# Patient Record
Sex: FEMALE | Race: WHITE | NOT HISPANIC OR LATINO | Employment: OTHER | URBAN - METROPOLITAN AREA
[De-identification: names, ages, dates, MRNs, and addresses within clinical notes are randomized per-mention and may not be internally consistent; named-entity substitution may affect disease eponyms.]

---

## 2021-05-25 ENCOUNTER — RECORDS - HEALTHEAST (OUTPATIENT)
Dept: ADMINISTRATIVE | Facility: CLINIC | Age: 76
End: 2021-05-25

## 2022-02-04 RX ORDER — FUROSEMIDE 40 MG
TABLET ORAL
Qty: 90 TABLET | OUTPATIENT
Start: 2022-02-04

## 2022-02-05 NOTE — TELEPHONE ENCOUNTER
No primary care provider listed.  Refill request denied.    Hortencia Carrizales, MAJO  Triage Nurse Advisor

## 2022-09-16 RX ORDER — ALENDRONATE SODIUM 70 MG/1
TABLET ORAL
Qty: 12 TABLET | OUTPATIENT
Start: 2022-09-16

## 2022-10-20 RX ORDER — ATORVASTATIN CALCIUM 40 MG/1
TABLET, FILM COATED ORAL
Qty: 90 TABLET | Refills: 0 | OUTPATIENT
Start: 2022-10-20

## 2022-10-20 NOTE — TELEPHONE ENCOUNTER
"Refill denied.  She has never been seen in our system.    Last Written Prescription Date:  ???  Last Fill Quantity: ???,  # refills: ???   Last office visit provider:  NEVER    Requested Prescriptions   Pending Prescriptions Disp Refills     atorvastatin (LIPITOR) 40 MG tablet [Pharmacy Med Name: ATORVASTATIN CALCIUM 40 MG Tablet] 90 tablet      Sig: TAKE 1 TABLET EVERY DAY       Statins Protocol Failed - 10/19/2022  4:30 PM        Failed - LDL on file in past 12 months     No lab results found.          Failed - Recent (12 mo) or future (30 days) visit within the authorizing provider's specialty     Patient has had an office visit with the authorizing provider or a provider within the authorizing providers department within the previous 12 mos or has a future within next 30 days. See \"Patient Info\" tab in inbasket, or \"Choose Columns\" in Meds & Orders section of the refill encounter.              Failed - Medication is active on med list        Passed - No abnormal creatine kinase in past 12 months     No lab results found.             Passed - Patient is age 18 or older        Passed - No active pregnancy on record        Passed - No positive pregnancy test in past 12 months             Aleshia Johnson 10/20/22 2:11 PM  "

## 2023-07-22 ENCOUNTER — HOSPITAL ENCOUNTER (OUTPATIENT)
Facility: HOSPITAL | Age: 78
Setting detail: OBSERVATION
Discharge: NURSING, REHAB OR LONG-TERM CARE | End: 2023-07-25
Attending: STUDENT IN AN ORGANIZED HEALTH CARE EDUCATION/TRAINING PROGRAM | Admitting: INTERNAL MEDICINE
Payer: COMMERCIAL

## 2023-07-22 DIAGNOSIS — R53.1 WEAKNESS: ICD-10-CM

## 2023-07-22 DIAGNOSIS — Z96.652 STATUS POST TOTAL LEFT KNEE REPLACEMENT: Primary | ICD-10-CM

## 2023-07-22 DIAGNOSIS — E83.42 HYPOMAGNESEMIA: ICD-10-CM

## 2023-07-22 LAB
ALBUMIN SERPL BCG-MCNC: 3.7 G/DL (ref 3.5–5.2)
ALBUMIN UR-MCNC: NEGATIVE MG/DL
ALP SERPL-CCNC: 59 U/L (ref 35–104)
ALT SERPL W P-5'-P-CCNC: 14 U/L (ref 0–50)
ANION GAP SERPL CALCULATED.3IONS-SCNC: 10 MMOL/L (ref 7–15)
APPEARANCE UR: CLEAR
AST SERPL W P-5'-P-CCNC: 25 U/L (ref 0–45)
BASOPHILS # BLD AUTO: 0 10E3/UL (ref 0–0.2)
BASOPHILS NFR BLD AUTO: 1 %
BILIRUB SERPL-MCNC: 0.2 MG/DL
BILIRUB UR QL STRIP: NEGATIVE
BUN SERPL-MCNC: 11.5 MG/DL (ref 8–23)
CALCIUM SERPL-MCNC: 10.8 MG/DL (ref 8.8–10.2)
CHLORIDE SERPL-SCNC: 108 MMOL/L (ref 98–107)
COLOR UR AUTO: ABNORMAL
CREAT SERPL-MCNC: 0.62 MG/DL (ref 0.51–0.95)
DEPRECATED HCO3 PLAS-SCNC: 23 MMOL/L (ref 22–29)
EOSINOPHIL # BLD AUTO: 0 10E3/UL (ref 0–0.7)
EOSINOPHIL NFR BLD AUTO: 0 %
ERYTHROCYTE [DISTWIDTH] IN BLOOD BY AUTOMATED COUNT: 17.5 % (ref 10–15)
GFR SERPL CREATININE-BSD FRML MDRD: >90 ML/MIN/1.73M2
GLUCOSE BLDC GLUCOMTR-MCNC: 127 MG/DL (ref 70–99)
GLUCOSE BLDC GLUCOMTR-MCNC: 147 MG/DL (ref 70–99)
GLUCOSE SERPL-MCNC: 122 MG/DL (ref 70–99)
GLUCOSE UR STRIP-MCNC: 30 MG/DL
HCT VFR BLD AUTO: 31.7 % (ref 35–47)
HGB BLD-MCNC: 9.6 G/DL (ref 11.7–15.7)
HGB UR QL STRIP: NEGATIVE
IMM GRANULOCYTES # BLD: 0 10E3/UL
IMM GRANULOCYTES NFR BLD: 0 %
KETONES UR STRIP-MCNC: NEGATIVE MG/DL
LACTATE SERPL-SCNC: 1.1 MMOL/L (ref 0.7–2)
LEUKOCYTE ESTERASE UR QL STRIP: NEGATIVE
LYMPHOCYTES # BLD AUTO: 1.1 10E3/UL (ref 0.8–5.3)
LYMPHOCYTES NFR BLD AUTO: 13 %
MAGNESIUM SERPL-MCNC: 1.6 MG/DL (ref 1.7–2.3)
MCH RBC QN AUTO: 24.7 PG (ref 26.5–33)
MCHC RBC AUTO-ENTMCNC: 30.3 G/DL (ref 31.5–36.5)
MCV RBC AUTO: 82 FL (ref 78–100)
MONOCYTES # BLD AUTO: 1.1 10E3/UL (ref 0–1.3)
MONOCYTES NFR BLD AUTO: 13 %
MUCOUS THREADS #/AREA URNS LPF: PRESENT /LPF
NEUTROPHILS # BLD AUTO: 6.2 10E3/UL (ref 1.6–8.3)
NEUTROPHILS NFR BLD AUTO: 73 %
NITRATE UR QL: NEGATIVE
NRBC # BLD AUTO: 0 10E3/UL
NRBC BLD AUTO-RTO: 0 /100
PH UR STRIP: 5.5 [PH] (ref 5–7)
PLATELET # BLD AUTO: 325 10E3/UL (ref 150–450)
POTASSIUM SERPL-SCNC: 3.8 MMOL/L (ref 3.4–5.3)
PROT SERPL-MCNC: 6.2 G/DL (ref 6.4–8.3)
RBC # BLD AUTO: 3.88 10E6/UL (ref 3.8–5.2)
RBC URINE: <1 /HPF
SODIUM SERPL-SCNC: 141 MMOL/L (ref 136–145)
SP GR UR STRIP: 1.01 (ref 1–1.03)
SQUAMOUS EPITHELIAL: 1 /HPF
TRANSITIONAL EPI: <1 /HPF
TROPONIN T SERPL HS-MCNC: 14 NG/L
UROBILINOGEN UR STRIP-MCNC: <2 MG/DL
WBC # BLD AUTO: 8.5 10E3/UL (ref 4–11)
WBC URINE: 0 /HPF

## 2023-07-22 PROCEDURE — 250N000013 HC RX MED GY IP 250 OP 250 PS 637: Performed by: INTERNAL MEDICINE

## 2023-07-22 PROCEDURE — 36415 COLL VENOUS BLD VENIPUNCTURE: CPT | Performed by: STUDENT IN AN ORGANIZED HEALTH CARE EDUCATION/TRAINING PROGRAM

## 2023-07-22 PROCEDURE — 258N000003 HC RX IP 258 OP 636: Performed by: STUDENT IN AN ORGANIZED HEALTH CARE EDUCATION/TRAINING PROGRAM

## 2023-07-22 PROCEDURE — 250N000012 HC RX MED GY IP 250 OP 636 PS 637: Performed by: INTERNAL MEDICINE

## 2023-07-22 PROCEDURE — 250N000011 HC RX IP 250 OP 636: Mod: JZ | Performed by: INTERNAL MEDICINE

## 2023-07-22 PROCEDURE — 96372 THER/PROPH/DIAG INJ SC/IM: CPT | Mod: XS | Performed by: INTERNAL MEDICINE

## 2023-07-22 PROCEDURE — 81001 URINALYSIS AUTO W/SCOPE: CPT | Performed by: STUDENT IN AN ORGANIZED HEALTH CARE EDUCATION/TRAINING PROGRAM

## 2023-07-22 PROCEDURE — 96360 HYDRATION IV INFUSION INIT: CPT

## 2023-07-22 PROCEDURE — G0378 HOSPITAL OBSERVATION PER HR: HCPCS

## 2023-07-22 PROCEDURE — 84484 ASSAY OF TROPONIN QUANT: CPT | Performed by: STUDENT IN AN ORGANIZED HEALTH CARE EDUCATION/TRAINING PROGRAM

## 2023-07-22 PROCEDURE — 93005 ELECTROCARDIOGRAM TRACING: CPT | Performed by: STUDENT IN AN ORGANIZED HEALTH CARE EDUCATION/TRAINING PROGRAM

## 2023-07-22 PROCEDURE — 80053 COMPREHEN METABOLIC PANEL: CPT | Performed by: STUDENT IN AN ORGANIZED HEALTH CARE EDUCATION/TRAINING PROGRAM

## 2023-07-22 PROCEDURE — 96361 HYDRATE IV INFUSION ADD-ON: CPT

## 2023-07-22 PROCEDURE — 250N000011 HC RX IP 250 OP 636: Mod: JZ | Performed by: STUDENT IN AN ORGANIZED HEALTH CARE EDUCATION/TRAINING PROGRAM

## 2023-07-22 PROCEDURE — 82962 GLUCOSE BLOOD TEST: CPT

## 2023-07-22 PROCEDURE — 99222 1ST HOSP IP/OBS MODERATE 55: CPT | Performed by: INTERNAL MEDICINE

## 2023-07-22 PROCEDURE — 83735 ASSAY OF MAGNESIUM: CPT | Performed by: STUDENT IN AN ORGANIZED HEALTH CARE EDUCATION/TRAINING PROGRAM

## 2023-07-22 PROCEDURE — 99285 EMERGENCY DEPT VISIT HI MDM: CPT | Mod: 25

## 2023-07-22 PROCEDURE — 250N000013 HC RX MED GY IP 250 OP 250 PS 637: Performed by: STUDENT IN AN ORGANIZED HEALTH CARE EDUCATION/TRAINING PROGRAM

## 2023-07-22 PROCEDURE — 85025 COMPLETE CBC W/AUTO DIFF WBC: CPT | Performed by: STUDENT IN AN ORGANIZED HEALTH CARE EDUCATION/TRAINING PROGRAM

## 2023-07-22 PROCEDURE — 83605 ASSAY OF LACTIC ACID: CPT | Performed by: STUDENT IN AN ORGANIZED HEALTH CARE EDUCATION/TRAINING PROGRAM

## 2023-07-22 PROCEDURE — 96365 THER/PROPH/DIAG IV INF INIT: CPT

## 2023-07-22 RX ORDER — ACETAMINOPHEN 650 MG/1
650 SUPPOSITORY RECTAL EVERY 6 HOURS PRN
Status: DISCONTINUED | OUTPATIENT
Start: 2023-07-22 | End: 2023-07-25 | Stop reason: HOSPADM

## 2023-07-22 RX ORDER — ONDANSETRON 4 MG/1
4 TABLET, ORALLY DISINTEGRATING ORAL EVERY 8 HOURS PRN
COMMUNITY
End: 2023-07-22

## 2023-07-22 RX ORDER — NICOTINE POLACRILEX 4 MG
15-30 LOZENGE BUCCAL
Status: DISCONTINUED | OUTPATIENT
Start: 2023-07-22 | End: 2023-07-25 | Stop reason: HOSPADM

## 2023-07-22 RX ORDER — LISINOPRIL 10 MG/1
10 TABLET ORAL DAILY
COMMUNITY

## 2023-07-22 RX ORDER — ALLOPURINOL 300 MG/1
300 TABLET ORAL DAILY
COMMUNITY

## 2023-07-22 RX ORDER — ACETAMINOPHEN 325 MG/1
325 TABLET ORAL ONCE
Status: COMPLETED | OUTPATIENT
Start: 2023-07-22 | End: 2023-07-22

## 2023-07-22 RX ORDER — AMOXICILLIN 250 MG
1 CAPSULE ORAL 2 TIMES DAILY
Status: DISCONTINUED | OUTPATIENT
Start: 2023-07-22 | End: 2023-07-24

## 2023-07-22 RX ORDER — FUROSEMIDE 20 MG
40 TABLET ORAL DAILY
Status: DISCONTINUED | OUTPATIENT
Start: 2023-07-22 | End: 2023-07-25 | Stop reason: HOSPADM

## 2023-07-22 RX ORDER — ONDANSETRON 4 MG/1
4 TABLET, ORALLY DISINTEGRATING ORAL EVERY 6 HOURS PRN
Status: DISCONTINUED | OUTPATIENT
Start: 2023-07-22 | End: 2023-07-25 | Stop reason: HOSPADM

## 2023-07-22 RX ORDER — NALOXONE HYDROCHLORIDE 0.4 MG/ML
0.2 INJECTION, SOLUTION INTRAMUSCULAR; INTRAVENOUS; SUBCUTANEOUS
Status: DISCONTINUED | OUTPATIENT
Start: 2023-07-22 | End: 2023-07-25 | Stop reason: HOSPADM

## 2023-07-22 RX ORDER — PRIMIDONE 50 MG/1
100 TABLET ORAL 3 TIMES DAILY
Status: DISCONTINUED | OUTPATIENT
Start: 2023-07-22 | End: 2023-07-25 | Stop reason: HOSPADM

## 2023-07-22 RX ORDER — ATORVASTATIN CALCIUM 40 MG/1
40 TABLET, FILM COATED ORAL DAILY
Status: DISCONTINUED | OUTPATIENT
Start: 2023-07-22 | End: 2023-07-25 | Stop reason: HOSPADM

## 2023-07-22 RX ORDER — POLYETHYLENE GLYCOL 3350 17 G/17G
17 POWDER, FOR SOLUTION ORAL DAILY PRN
Status: DISCONTINUED | OUTPATIENT
Start: 2023-07-22 | End: 2023-07-25 | Stop reason: HOSPADM

## 2023-07-22 RX ORDER — AMOXICILLIN 250 MG
2 CAPSULE ORAL 2 TIMES DAILY
Status: DISCONTINUED | OUTPATIENT
Start: 2023-07-22 | End: 2023-07-24

## 2023-07-22 RX ORDER — FUROSEMIDE 40 MG
40 TABLET ORAL DAILY
COMMUNITY

## 2023-07-22 RX ORDER — PRIMIDONE 50 MG/1
100 TABLET ORAL 3 TIMES DAILY
COMMUNITY

## 2023-07-22 RX ORDER — ONDANSETRON 2 MG/ML
4 INJECTION INTRAMUSCULAR; INTRAVENOUS EVERY 6 HOURS PRN
Status: DISCONTINUED | OUTPATIENT
Start: 2023-07-22 | End: 2023-07-25 | Stop reason: HOSPADM

## 2023-07-22 RX ORDER — POTASSIUM CHLORIDE 750 MG/1
10 TABLET, EXTENDED RELEASE ORAL 2 TIMES DAILY
COMMUNITY

## 2023-07-22 RX ORDER — MAGNESIUM SULFATE HEPTAHYDRATE 40 MG/ML
2 INJECTION, SOLUTION INTRAVENOUS ONCE
Status: COMPLETED | OUTPATIENT
Start: 2023-07-22 | End: 2023-07-22

## 2023-07-22 RX ORDER — ATORVASTATIN CALCIUM 40 MG/1
40 TABLET, FILM COATED ORAL DAILY
COMMUNITY

## 2023-07-22 RX ORDER — ENOXAPARIN SODIUM 100 MG/ML
40 INJECTION SUBCUTANEOUS EVERY 24 HOURS
Status: DISCONTINUED | OUTPATIENT
Start: 2023-07-22 | End: 2023-07-25 | Stop reason: HOSPADM

## 2023-07-22 RX ORDER — DICYCLOMINE HCL 20 MG
20 TABLET ORAL 3 TIMES DAILY PRN
Status: DISCONTINUED | OUTPATIENT
Start: 2023-07-22 | End: 2023-07-25 | Stop reason: HOSPADM

## 2023-07-22 RX ORDER — OXYCODONE HYDROCHLORIDE 5 MG/1
5 TABLET ORAL EVERY 4 HOURS PRN
Status: DISCONTINUED | OUTPATIENT
Start: 2023-07-22 | End: 2023-07-22

## 2023-07-22 RX ORDER — NALOXONE HYDROCHLORIDE 0.4 MG/ML
0.4 INJECTION, SOLUTION INTRAMUSCULAR; INTRAVENOUS; SUBCUTANEOUS
Status: DISCONTINUED | OUTPATIENT
Start: 2023-07-22 | End: 2023-07-25 | Stop reason: HOSPADM

## 2023-07-22 RX ORDER — ALLOPURINOL 300 MG/1
300 TABLET ORAL DAILY
Status: DISCONTINUED | OUTPATIENT
Start: 2023-07-22 | End: 2023-07-25 | Stop reason: HOSPADM

## 2023-07-22 RX ORDER — ALENDRONATE SODIUM 70 MG/1
70 TABLET ORAL
Status: DISCONTINUED | OUTPATIENT
Start: 2023-07-22 | End: 2023-07-22

## 2023-07-22 RX ORDER — PROCHLORPERAZINE MALEATE 5 MG
5 TABLET ORAL EVERY 6 HOURS PRN
Status: DISCONTINUED | OUTPATIENT
Start: 2023-07-22 | End: 2023-07-25 | Stop reason: HOSPADM

## 2023-07-22 RX ORDER — GABAPENTIN 300 MG/1
600 CAPSULE ORAL 2 TIMES DAILY
COMMUNITY

## 2023-07-22 RX ORDER — OXYCODONE HYDROCHLORIDE 5 MG/1
5 TABLET ORAL EVERY 4 HOURS PRN
Status: ON HOLD | COMMUNITY
End: 2023-07-25

## 2023-07-22 RX ORDER — OXYCODONE HYDROCHLORIDE 5 MG/1
5-10 TABLET ORAL EVERY 4 HOURS PRN
Status: DISCONTINUED | OUTPATIENT
Start: 2023-07-22 | End: 2023-07-25 | Stop reason: HOSPADM

## 2023-07-22 RX ORDER — ALENDRONATE SODIUM 70 MG/1
70 TABLET ORAL
COMMUNITY

## 2023-07-22 RX ORDER — DICYCLOMINE HCL 20 MG
20 TABLET ORAL 3 TIMES DAILY PRN
COMMUNITY

## 2023-07-22 RX ORDER — DEXTROSE MONOHYDRATE 25 G/50ML
25-50 INJECTION, SOLUTION INTRAVENOUS
Status: DISCONTINUED | OUTPATIENT
Start: 2023-07-22 | End: 2023-07-25 | Stop reason: HOSPADM

## 2023-07-22 RX ORDER — ACETAMINOPHEN 325 MG/1
650 TABLET ORAL EVERY 6 HOURS PRN
Status: DISCONTINUED | OUTPATIENT
Start: 2023-07-22 | End: 2023-07-25 | Stop reason: HOSPADM

## 2023-07-22 RX ORDER — OMEGA-3-ACID ETHYL ESTERS 1 G/1
1 CAPSULE, LIQUID FILLED ORAL DAILY
COMMUNITY

## 2023-07-22 RX ORDER — PROCHLORPERAZINE 25 MG
12.5 SUPPOSITORY, RECTAL RECTAL EVERY 12 HOURS PRN
Status: DISCONTINUED | OUTPATIENT
Start: 2023-07-22 | End: 2023-07-25 | Stop reason: HOSPADM

## 2023-07-22 RX ORDER — GABAPENTIN 300 MG/1
600 CAPSULE ORAL 2 TIMES DAILY
Status: DISCONTINUED | OUTPATIENT
Start: 2023-07-22 | End: 2023-07-25 | Stop reason: HOSPADM

## 2023-07-22 RX ORDER — HYDROXYZINE HYDROCHLORIDE 10 MG/1
10 TABLET, FILM COATED ORAL EVERY 6 HOURS PRN
COMMUNITY
End: 2023-07-22

## 2023-07-22 RX ORDER — LISINOPRIL 5 MG/1
10 TABLET ORAL DAILY
Status: DISCONTINUED | OUTPATIENT
Start: 2023-07-22 | End: 2023-07-25 | Stop reason: HOSPADM

## 2023-07-22 RX ORDER — POTASSIUM CHLORIDE 750 MG/1
10 TABLET, EXTENDED RELEASE ORAL 2 TIMES DAILY WITH MEALS
Status: DISCONTINUED | OUTPATIENT
Start: 2023-07-22 | End: 2023-07-25 | Stop reason: HOSPADM

## 2023-07-22 RX ADMIN — POTASSIUM CHLORIDE 10 MEQ: 750 TABLET, EXTENDED RELEASE ORAL at 18:08

## 2023-07-22 RX ADMIN — OXYCODONE HYDROCHLORIDE 5 MG: 5 TABLET ORAL at 23:35

## 2023-07-22 RX ADMIN — ACETAMINOPHEN 325 MG: 325 TABLET ORAL at 14:35

## 2023-07-22 RX ADMIN — OXYCODONE HYDROCHLORIDE 5 MG: 5 TABLET ORAL at 18:07

## 2023-07-22 RX ADMIN — MAGNESIUM SULFATE HEPTAHYDRATE 2 G: 40 INJECTION, SOLUTION INTRAVENOUS at 14:15

## 2023-07-22 RX ADMIN — GABAPENTIN 600 MG: 300 CAPSULE ORAL at 19:55

## 2023-07-22 RX ADMIN — INSULIN ASPART 1 UNITS: 100 INJECTION, SOLUTION INTRAVENOUS; SUBCUTANEOUS at 16:53

## 2023-07-22 RX ADMIN — SENNOSIDES AND DOCUSATE SODIUM 2 TABLET: 50; 8.6 TABLET ORAL at 19:55

## 2023-07-22 RX ADMIN — METFORMIN HYDROCHLORIDE 500 MG: 500 TABLET, FILM COATED ORAL at 19:55

## 2023-07-22 RX ADMIN — LISINOPRIL 10 MG: 5 TABLET ORAL at 19:55

## 2023-07-22 RX ADMIN — ENOXAPARIN SODIUM 40 MG: 40 INJECTION SUBCUTANEOUS at 17:04

## 2023-07-22 RX ADMIN — SODIUM CHLORIDE 1000 ML: 9 INJECTION, SOLUTION INTRAVENOUS at 12:17

## 2023-07-22 RX ADMIN — ATORVASTATIN CALCIUM 40 MG: 40 TABLET, FILM COATED ORAL at 18:08

## 2023-07-22 RX ADMIN — FUROSEMIDE 40 MG: 20 TABLET ORAL at 18:08

## 2023-07-22 RX ADMIN — TIZANIDINE 4 MG: 2 TABLET ORAL at 19:54

## 2023-07-22 RX ADMIN — ALLOPURINOL 300 MG: 300 TABLET ORAL at 18:08

## 2023-07-22 RX ADMIN — PRIMIDONE 100 MG: 50 TABLET ORAL at 19:54

## 2023-07-22 ASSESSMENT — ACTIVITIES OF DAILY LIVING (ADL)
ADLS_ACUITY_SCORE: 45
ADLS_ACUITY_SCORE: 41
ADLS_ACUITY_SCORE: 41
ADLS_ACUITY_SCORE: 35
ADLS_ACUITY_SCORE: 35
DEPENDENT_IADLS:: INDEPENDENT
ADLS_ACUITY_SCORE: 41

## 2023-07-22 ASSESSMENT — ENCOUNTER SYMPTOMS
WEAKNESS: 1
ABDOMINAL PAIN: 0
FEVER: 0
FATIGUE: 1

## 2023-07-22 NOTE — CONSULTS
"Care Management Initial Consult    General Information  Assessment completed with: Patient, Children, Lorie and mai Harvey  Type of CM/SW Visit: Initial Assessment    Primary Care Provider verified and updated as needed: Yes   Readmission within the last 30 days: no previous admission in last 30 days      Reason for Consult: discharge planning  Advance Care Planning: Advance Care Planning Reviewed: no concerns identified          Communication Assessment  Patient's communication style: spoken language (English or Bilingual)                            Living Environment:   People in home: child(corina), adult  Lorie and mai Harvey  Current living Arrangements: apartment (\"elevator access\")      Able to return to prior arrangements: other (see comments) (unknown at this time, may need a TCU)       Family/Social Support:  Care provided by: self  Provides care for: no one     Children          Description of Support System: Supportive, Involved    Support Assessment: Adequate family and caregiver support, Adequate social supports, Patient communicates needs well met    Current Resources:   Patient receiving home care services: No     Community Resources: None  Equipment currently used at home: walker, rolling (\"I borrowed a 4WW from a friend.\")  Supplies currently used at home: Hearing Aid Batteries    Employment/Financial:  Employment Status: retired     Employment/ Comments: \"no  history\"  Financial Concerns:     Referral to Financial Worker: No       Does the patient's insurance plan have a 3 day qualifying hospital stay waiver?  No    Lifestyle & Psychosocial Needs:  Social Determinants of Health     Tobacco Use: Not on file   Alcohol Use: Not on file   Financial Resource Strain: Not on file   Food Insecurity: Not on file   Transportation Needs: Not on file   Physical Activity: Not on file   Stress: Not on file   Social Connections: Not on file   Intimate Partner Violence: Not on file   Depression: " "Not on file   Housing Stability: Not on file       Functional Status:  Prior to admission patient needed assistance:   Dependent ADLs:: Ambulation-walker, Independent  Dependent IADLs:: Independent (\"I do drive, but I cannot drive now. My daughter Candice doesn't drive. Friends can help with transportation if needed\".)  Assesssment of Functional Status: Not at baseline with ADL Functioning, Not at  functional baseline, Not at baseline with mobility    Mental Health Status:          Chemical Dependency Status:                Values/Beliefs:  Spiritual, Cultural Beliefs, Christian Practices, Values that affect care:                 Additional Information:  Lorie lives in an apartment with her daughter Candice. They have elevator access. \"I borrowed a 4WW from a friend and have been using it lately for mobility\".    She is independent with ADLs and IADLs. \"I do drive, but I cannot drive now. My daughter Candice doesn't drive. Friends can possibly help with transportation if needed\".    She may need a TCU, awaiting PT/OT recommendations. I made her aware that she is observation status and that may affect the coverage for the TCU. If that is the case she \"might be more interested in Skilled Home Care services instead of TCU\".    They will try to find a friend to transport or may need  Health transport.    CM to follow for medical progression of care, discharge recommendations, and final discharge plan.    Court Benitez RN      "

## 2023-07-22 NOTE — ED TRIAGE NOTES
She comes from home where she lives with her daughter. She had knee surgery recently and the recovery has been going well. She has some fatigue set in during the night for her. She was not able to get herself up to us the restroom this morning. She states she was fine last night going to bed. She denies any pain. Last took her pain medications at 2200 hours last night. She appears tired. She has been using a walker to get around her house.

## 2023-07-22 NOTE — ED NOTES
Bed: JNEDH-E  Expected date: 7/22/23  Expected time: 11:14 AM  Means of arrival:   Comments:  Wbl/increased weakness since surgery

## 2023-07-22 NOTE — ED PROVIDER NOTES
EMERGENCY DEPARTMENT ENCOUNTER      NAME: Lorie Garrido  AGE: 77 year old female  YOB: 1945  MRN: [id number]  EVALUATION DATE & TIME: 7/22/2023 11:27 AM    PCP: No primary care provider on file.    ED PROVIDER: Aries Conte M.D.      Chief Complaint   Patient presents with     Fatigue         FINAL IMPRESSION:  1. Weakness          ED COURSE & MEDICAL DECISION MAKING:    Pertinent Labs & Imaging studies reviewed. (See chart for details)  77 year old female presents to the Emergency Department for evaluation of weakness.  Patient with recent total knee surgery on Thursday.  She says her pain has been well controlled but she is feeling weak and has been unable to get out of bed for the last day and a half.  Her daughter takes care of her but has not been able to help her with her ADLs.  Patient called the ambulance today because she has not been able to function.  Denied chest pain no fever no belly pain no shortness of breath.  Was concern for the possibility of infection versus postop pain versus ACS versus simple failure to thrive.  Work-up here in the ER shows no UTI, there is normal blood work other than a slightly low magnesium.  EKG is normal.  Spoke with the care manager who felt that the patient would not likely be able to be placed in a TCU today.  Spoke at length with the patient regarding the fact that this may just be postop deconditioning and the patient states that she is uncomfortable being at home and when we attempted to road test her she was unable to move much.  Because of this we will admit her to the hospital for further care.    At the conclusion of the encounter I discussed the results of all of the tests and the disposition. The questions were answered. The patient or family acknowledged understanding and was agreeable with the care plan.          12:02 PM I met with the patient, obtained history, performed an initial exam, and discussed options and plan for diagnostics and  treatment here in the ED.      Medical Decision Making    History:    Supplemental history from: Documented in chart, if applicable    External Record(s) reviewed: Documented in chart, if applicable.    Work Up:    Chart documentation includes differential considered and any EKGs or imaging independently interpreted by provider, where specified.    In additional to work up documented, I considered the following work up: Documented in chart, if applicable.    External consultation:    Discussion of management with another provider: Documented in chart, if applicable    Complicating factors:    Care impacted by chronic illness: Diabetes, Hyperlipidemia and Hypertension    Care affected by social determinants of health: Access to Medical Care    Disposition considerations: Admit.        This patient involved a high degree of complexity in medical decision making, as significant risks were present and assessed. Recent encounters & results in medical record reviewed by me.     All workup (i.e. any EKG/labs/imaging as per charting below) reviewed and independently interpreted by me. See respective sections for details.        MEDICATIONS GIVEN IN THE EMERGENCY:  Medications   magnesium sulfate 2 g in 50 mL sterile water intermittent infusion (has no administration in time range)   0.9% sodium chloride BOLUS (0 mLs Intravenous Stopped 7/22/23 1317)       NEW PRESCRIPTIONS STARTED AT TODAY'S ER VISIT  New Prescriptions    No medications on file          =================================================================    HPI    Patient information was obtained from: patient    Use of : N/A        Lorie Garrido is a 77 year old female with a pertinent history of chronic lower leg swelling, s/p left total knee replacement, DM2 and HTN, and HLD who presents for evaluation of generalized weakness.    Patient began feeling generally weak yesterday after having left knee replacement the day before (7/20). She was able  to get around with her walker yesterday morning but progressively felt weak throughout the day. She endorses left knee pain but denies increased pain. Today, she was laying in bed and tried to get up to use the bathroom but was unable to get out of bed. She felt very weak and tired so she went back to sleep and woke up half an hour later but still wasn't able get up on her own. She told her daughter who then called EMS en route to the ED. Patient reports that she feels dehydrated but has been eating and drinking as normal. Patient denies fevers, chest pain, abdomina pain, and any other symptoms.    REVIEW OF SYSTEMS   Review of Systems   Constitutional: Positive for fatigue. Negative for fever.   Cardiovascular: Negative for chest pain.   Gastrointestinal: Negative for abdominal pain.   Neurological: Positive for weakness (generalized).   All other systems reviewed and are negative.       PAST MEDICAL HISTORY:  History reviewed. No pertinent past medical history.    PAST SURGICAL HISTORY:  History reviewed. No pertinent surgical history.        CURRENT MEDICATIONS:    No current outpatient medications on file.      ALLERGIES:  Allergies   Allergen Reactions     Penicillins Hives       FAMILY HISTORY:  No family history on file.    SOCIAL HISTORY:   Social History     Socioeconomic History     Marital status:        VITALS:  BP (!) 172/74   Pulse 106   Temp 98.3  F (36.8  C) (Oral)   Resp 18   Wt 91 kg (200 lb 9.9 oz)   SpO2 94%       PHYSICAL EXAM    Constitutional: Well developed, Well nourished, NAD, GCS 15  HENT: Normocephalic, Atraumatic, Bilateral external ears normal, Oropharynx normal, Nose normal. Neck-  Normal range of motion, No tenderness, Supple, No stridor. Dry mucous membranes.  Eyes: PERRL, EOMI, Conjunctiva normal, No discharge.   Respiratory: Normal breath sounds, No respiratory distress, No wheezing, Speaks full sentences easily. No cough.  Cardiovascular: Normal heart rate, Regular  rhythm, No murmurs, No rubs, No gallops. Chest wall nontender.  GI:Soft, No tenderness, No masses, No flank tenderness. No rebound or guarding.   Musculoskeletal: 2+ DP pulses. No edema.No cyanosis, No clubbing. Good range of motion in all major joints. No tenderness to palpation or major deformities noted.   Integument: Warm, Dry, No erythema, No rash. No petechiae. Healing left knee replacement incision.  Neurologic: Alert & oriented x 3,  CN 3-12 intact Normal motor function, Normal sensory function, No focal deficits noted. Normal gait. Normal finger to nose bilaterally  Psychiatric: Affect normal, Judgment normal, Mood normal. Cooperative.          LAB:  All pertinent labs reviewed and interpreted.  Labs Ordered and Resulted from Time of ED Arrival to Time of ED Departure   COMPREHENSIVE METABOLIC PANEL - Abnormal       Result Value    Sodium 141      Potassium 3.8      Chloride 108 (*)     Carbon Dioxide (CO2) 23      Anion Gap 10      Urea Nitrogen 11.5      Creatinine 0.62      Calcium 10.8 (*)     Glucose 122 (*)     Alkaline Phosphatase 59      AST 25      ALT 14      Protein Total 6.2 (*)     Albumin 3.7      Bilirubin Total 0.2      GFR Estimate >90     CBC WITH PLATELETS AND DIFFERENTIAL - Abnormal    WBC Count 8.5      RBC Count 3.88      Hemoglobin 9.6 (*)     Hematocrit 31.7 (*)     MCV 82      MCH 24.7 (*)     MCHC 30.3 (*)     RDW 17.5 (*)     Platelet Count 325      % Neutrophils 73      % Lymphocytes 13      % Monocytes 13      % Eosinophils 0      % Basophils 1      % Immature Granulocytes 0      NRBCs per 100 WBC 0      Absolute Neutrophils 6.2      Absolute Lymphocytes 1.1      Absolute Monocytes 1.1      Absolute Eosinophils 0.0      Absolute Basophils 0.0      Absolute Immature Granulocytes 0.0      Absolute NRBCs 0.0     ROUTINE UA WITH MICROSCOPIC REFLEX TO CULTURE - Abnormal    Color Urine Light Yellow      Appearance Urine Clear      Glucose Urine 30 (*)     Bilirubin Urine Negative       Ketones Urine Negative      Specific Gravity Urine 1.015      Blood Urine Negative      pH Urine 5.5      Protein Albumin Urine Negative      Urobilinogen Urine <2.0      Nitrite Urine Negative      Leukocyte Esterase Urine Negative      Mucus Urine Present (*)     RBC Urine <1      WBC Urine 0      Squamous Epithelials Urine 1      Transitional Epithelials Urine <1     MAGNESIUM - Abnormal    Magnesium 1.6 (*)    TROPONIN T, HIGH SENSITIVITY - Normal    Troponin T, High Sensitivity 14     LACTIC ACID WHOLE BLOOD - Normal    Lactic Acid 1.1         RADIOLOGY:  Reviewed all pertinent imaging. Please see official radiology report.  No orders to display       EKG:    Performed at: 07/22/2023 1242    Impression: Sinus tachycardia with 1st degree AV block. Otherwise normal ECG.    Rate: 104 BPM  Rhythm: Sinus tachycardia  Axis: 45  WI Interval: 286 ms  QRS Interval: 72 ms  QTc Interval: 362 ms      I have independently reviewed and interpreted the EKG(s) documented above.    PROCEDURES:   None.      I, Margaret Galvez, am serving as a scribe to document services personally performed by Dr. Aries Conte based on my observation and the provider's statements to me. IAries MD attest that Margaret Galvez is acting in a scribe capacity, has observed my performance of the services and has documented them in accordance with my direction.    Aries Conte M.D.  Emergency Medicine  [facility] EMERGENCY DEPARTMENT  [address] [facility] [address] [facility] [id number] [phone]  Dept: [phone]       Aries Conte MD  07/22/23 5258

## 2023-07-22 NOTE — H&P
[facility]    History and Physical - Hospitalist Service       Date of Admission:  7/22/2023    Assessment & Plan      Lorie Garrido is a 77 year old female admitted on 7/22/2023. She had TKA recently and not doing well at home. Presents with general weakness.    General weakness  -multi-factorial   -pt/ot for placement  -poor appetite and oral intake    Hypomagnesia  -replace as per protocol    H/o TKA left  Postop pain  -pain control    DM 2  -PTA metformin  -sliding scale insulin    HTN  Hyperlipidemia  -PTA meds    IBS  Collagenous colitis  -chronic diarrhea    Chronic legs edema     Diet:  cardiac and diabetic  DVT Prophylaxis: Enoxaparin (Lovenox) SQ  Palmer Catheter: Not present  Lines: None     Cardiac Monitoring: None  Code Status:   full    Clinically Significant Risk Factors Present on Admission           # Hypercalcemia: Highest Ca = 10.8 mg/dL in last 2 days, will monitor as appropriate  # Hypomagnesemia: Lowest Mg = 1.6 mg/dL in last 2 days, will replace as needed       # Hypertension: Home medication list includes antihypertensive(s)               Disposition Plan      Expected Discharge Date: 07/23/2023              pt/sw for placement    Kathy Escamilla MD  Hospitalist Service  [facility]  Securely message with [employer] (more info)  Text page via [employer] Paging/Directory     ______________________________________________________________________    Chief Complaint   weakness    History is obtained from the patient    History of Present Illness   Lorie Garrido is a 77 year old female with a pertinent history of chronic lower leg swelling, s/p left total knee replacement (7/20), DM2, hyperparathyroidism, gout, IBS, collagenous colitis and HTN, and HLD who presents for evaluation of generalized weakness.     Patient began feeling generally weak yesterday after having left knee replacement the day before (7/20). She was able to get around with her walker  yesterday morning but progressively felt weak throughout the day. She endorses left knee pain but denies increased pain. Today, she was laying in bed and tried to get up to use the bathroom but was unable to get out of bed. She felt very weak and tired so she went back to sleep and woke up half an hour later but still wasn't able get up on her own. She told her daughter who then called EMS en route to the ED. Patient reports that she feels dehydrated but has been eating and drinking as normal. Patient denies fevers, chest pain, abdomina pain, and any other symptoms.      Past Medical History    chronic lower leg swelling, s/p left total knee replacement (7/20), DM2, hyperparathyroidism, gout, IBS, collagenous colitis and HTN, and HLD     Past Surgical History   Left TKA 7/20/23    Prior to Admission Medications   Prior to Admission Medications   Prescriptions Last Dose Informant Patient Reported? Taking?   alendronate (FOSAMAX) 70 MG tablet   Yes Yes   Sig: Take 70 mg by mouth every 7 days   allopurinol (ZYLOPRIM) 300 MG tablet   Yes Yes   Sig: Take 300 mg by mouth daily   atorvastatin (LIPITOR) 40 MG tablet   Yes Yes   Sig: Take 40 mg by mouth daily   dicyclomine (BENTYL) 20 MG tablet   Yes Yes   Sig: Take 20 mg by mouth 3 times daily as needed   furosemide (LASIX) 40 MG tablet   Yes Yes   Sig: Take 40 mg by mouth daily   gabapentin (NEURONTIN) 300 MG capsule   Yes Yes   Sig: Take 600 mg by mouth 2 times daily   insulin regular 100 UNIT/ML vial   Yes Yes   Sig: Inject Subcutaneous See Admin Instructions Inject as directed based on blood glucose sliding scale there times daily before meals and at bedtime. Max daily dose of 42 units.   lisinopril (ZESTRIL) 10 MG tablet   Yes Yes   Sig: Take 10 mg by mouth daily   ondansetron (ZOFRAN ODT) 4 MG ODT tab   Yes Yes   Sig: Take 4 mg by mouth every 8 hours as needed for nausea   potassium chloride ER (KLOR-CON M) 10 MEQ CR tablet   Yes Yes   Sig: Take 10 mEq by mouth 2  times daily   primidone (MYSOLINE) 50 MG tablet   Yes Yes   Sig: Take 100 mg by mouth 3 times daily      Facility-Administered Medications: None        Allergies   Allergies   Allergen Reactions     Penicillins Hives        Physical Exam   Vital Signs: Temp: 98.3  F (36.8  C) Temp src: Oral BP: (!) 172/74 Pulse: 115   Resp: 18 SpO2: 95 % O2 Device: None (Room air)    Weight: 200 lbs 9.9 oz    General.  Awake alert oriented not in acute distress.  HEENT.  Pupils equal round react to light, anicteric, EOM intact.  Neck supple no JVD.  CVS regular rhythm no murmur gallops.  Lungs.  Clear to auscultation bilateral no wheezing or rales.  Abdomen.  Soft nontender bowel sounds present.  Extremities.  No edema no calf tenderness.  Neurological.  Awake and alert. No focal deficit.  Skin no rash. No pallor.  Psych. Normal mood.     Medical Decision Making       65 MINUTES SPENT BY ME on the date of service doing chart review, history, exam, documentation & further activities per the note.      Data     I have personally reviewed the following data over the past 24 hrs:    8.5  \   9.6 (L)   / 325     141 108 (H) 11.5 /  122 (H)   3.8 23 0.62 \       ALT: 14 AST: 25 AP: 59 TBILI: 0.2   ALB: 3.7 TOT PROTEIN: 6.2 (L) LIPASE: N/A       Trop: 14 BNP: N/A       Procal: N/A CRP: N/A Lactic Acid: 1.1         Imaging results reviewed over the past 24 hrs:   No results found for this or any previous visit (from the past 24 hour(s)).

## 2023-07-22 NOTE — PHARMACY-ADMISSION MEDICATION HISTORY
Pharmacist Admission Medication History    Admission medication history is complete. The information provided in this note is only as accurate as the sources available at the time of the update.    Medication reconciliation/reorder completed by provider prior to medication history? No    Information Source(s): Patient and CareEverywhere/SureScripts via in-person    Pertinent Information: Patient has not had any of her home medications since yesterday 7/21/23. Patient said she is uncomfortable and is requesting something for pain at this time.    Changes made to PTA medication list:    Added: All are new.     Deleted: None    Changed: None    Medication Affordability:  Not including over the counter (OTC) medications, was there a time in the past 3 months when you did not take your medications as prescribed because of cost?: No    Allergies reviewed with patient and updates made in EHR: yes    Medication History Completed By: ALEXEI BRADLEY [facility] 7/22/2023 4:30 PM    Prior to Admission medications    Medication Sig Last Dose Taking? Auth Provider Long Term End Date   alendronate (FOSAMAX) 70 MG tablet Take 70 mg by mouth every 7 days Past Month at 7/13/23 Yes Unknown, Entered By History Yes    allopurinol (ZYLOPRIM) 300 MG tablet Take 300 mg by mouth daily 7/21/2023 at am Yes Unknown, Entered By History     atorvastatin (LIPITOR) 40 MG tablet Take 40 mg by mouth daily 7/21/2023 at am Yes Unknown, Entered By History Yes    dicyclomine (BENTYL) 20 MG tablet Take 20 mg by mouth 3 times daily as needed Past Month at prn Yes Unknown, Entered By History     furosemide (LASIX) 40 MG tablet Take 40 mg by mouth daily 7/21/2023 at am Yes Unknown, Entered By History Yes    gabapentin (NEURONTIN) 300 MG capsule Take 600 mg by mouth 2 times daily 7/21/2023 at pm Yes Unknown, Entered By History Yes    lisinopril (ZESTRIL) 10 MG tablet Take 10 mg by mouth daily  Yes Unknown, Entered By History Yes    metFORMIN (GLUCOPHAGE) 500 MG  tablet Take 500 mg by mouth 2 times daily (with meals) 7/21/2023 at pm Yes Unknown, Entered By History Yes    omega-3 acid ethyl esters (LOVAZA) 1 g capsule Take 1 g by mouth daily 7/21/2023 at am Yes Unknown, Entered By History     oxyCODONE (ROXICODONE) 5 MG tablet Take 5 mg by mouth every 4 hours as needed for severe pain 7/21/2023 at 10:30 Yes Unknown, Entered By History     potassium chloride ER (KLOR-CON M) 10 MEQ CR tablet Take 10 mEq by mouth 2 times daily 7/21/2023 at pm Yes Unknown, Entered By History     primidone (MYSOLINE) 50 MG tablet Take 100 mg by mouth 3 times daily 7/21/2023 at pm Yes Unknown, Entered By History Yes    tiZANidine (ZANAFLEX) 4 MG tablet Take 4 mg by mouth 2 times daily 7/21/2023 at pm Yes Unknown, Entered By History

## 2023-07-22 NOTE — ED NOTES
She was not able to ambulate with a walker even with two person standby assistance. She became dizzy and too fatigued.

## 2023-07-23 ENCOUNTER — APPOINTMENT (OUTPATIENT)
Dept: OCCUPATIONAL THERAPY | Facility: HOSPITAL | Age: 78
End: 2023-07-23
Attending: INTERNAL MEDICINE
Payer: COMMERCIAL

## 2023-07-23 ENCOUNTER — APPOINTMENT (OUTPATIENT)
Dept: PHYSICAL THERAPY | Facility: HOSPITAL | Age: 78
End: 2023-07-23
Attending: INTERNAL MEDICINE
Payer: COMMERCIAL

## 2023-07-23 LAB
GLUCOSE BLDC GLUCOMTR-MCNC: 127 MG/DL (ref 70–99)
GLUCOSE BLDC GLUCOMTR-MCNC: 84 MG/DL (ref 70–99)
GLUCOSE BLDC GLUCOMTR-MCNC: 90 MG/DL (ref 70–99)
GLUCOSE BLDC GLUCOMTR-MCNC: 98 MG/DL (ref 70–99)
HOLD SPECIMEN: NORMAL
MAGNESIUM SERPL-MCNC: 1.8 MG/DL (ref 1.7–2.3)

## 2023-07-23 PROCEDURE — 82962 GLUCOSE BLOOD TEST: CPT

## 2023-07-23 PROCEDURE — G0378 HOSPITAL OBSERVATION PER HR: HCPCS

## 2023-07-23 PROCEDURE — 97166 OT EVAL MOD COMPLEX 45 MIN: CPT | Mod: GO

## 2023-07-23 PROCEDURE — 250N000011 HC RX IP 250 OP 636: Mod: JZ | Performed by: INTERNAL MEDICINE

## 2023-07-23 PROCEDURE — 250N000013 HC RX MED GY IP 250 OP 250 PS 637: Performed by: INTERNAL MEDICINE

## 2023-07-23 PROCEDURE — 36415 COLL VENOUS BLD VENIPUNCTURE: CPT | Performed by: INTERNAL MEDICINE

## 2023-07-23 PROCEDURE — 99232 SBSQ HOSP IP/OBS MODERATE 35: CPT | Performed by: INTERNAL MEDICINE

## 2023-07-23 PROCEDURE — 96372 THER/PROPH/DIAG INJ SC/IM: CPT | Performed by: INTERNAL MEDICINE

## 2023-07-23 PROCEDURE — 97535 SELF CARE MNGMENT TRAINING: CPT | Mod: GO

## 2023-07-23 PROCEDURE — 83735 ASSAY OF MAGNESIUM: CPT | Performed by: INTERNAL MEDICINE

## 2023-07-23 PROCEDURE — 97110 THERAPEUTIC EXERCISES: CPT | Mod: GP

## 2023-07-23 PROCEDURE — 97162 PT EVAL MOD COMPLEX 30 MIN: CPT | Mod: GP

## 2023-07-23 RX ORDER — BISMUTH SUBSALICYLATE 262 MG/1
524 TABLET, CHEWABLE ORAL DAILY PRN
Status: DISCONTINUED | OUTPATIENT
Start: 2023-07-23 | End: 2023-07-24

## 2023-07-23 RX ADMIN — OXYCODONE HYDROCHLORIDE 5 MG: 5 TABLET ORAL at 12:23

## 2023-07-23 RX ADMIN — METFORMIN HYDROCHLORIDE 500 MG: 500 TABLET, FILM COATED ORAL at 08:19

## 2023-07-23 RX ADMIN — GABAPENTIN 600 MG: 300 CAPSULE ORAL at 19:30

## 2023-07-23 RX ADMIN — BISMUTH SUBSALICYLATE 524 MG: 262 TABLET, CHEWABLE ORAL at 22:21

## 2023-07-23 RX ADMIN — ATORVASTATIN CALCIUM 40 MG: 40 TABLET, FILM COATED ORAL at 08:05

## 2023-07-23 RX ADMIN — OXYCODONE HYDROCHLORIDE 10 MG: 5 TABLET ORAL at 22:18

## 2023-07-23 RX ADMIN — METFORMIN HYDROCHLORIDE 500 MG: 500 TABLET, FILM COATED ORAL at 17:26

## 2023-07-23 RX ADMIN — PRIMIDONE 100 MG: 50 TABLET ORAL at 08:19

## 2023-07-23 RX ADMIN — POTASSIUM CHLORIDE 10 MEQ: 750 TABLET, EXTENDED RELEASE ORAL at 17:26

## 2023-07-23 RX ADMIN — LISINOPRIL 10 MG: 5 TABLET ORAL at 08:19

## 2023-07-23 RX ADMIN — ACETAMINOPHEN 650 MG: 325 TABLET ORAL at 02:34

## 2023-07-23 RX ADMIN — TIZANIDINE 4 MG: 2 TABLET ORAL at 08:18

## 2023-07-23 RX ADMIN — GABAPENTIN 600 MG: 300 CAPSULE ORAL at 08:06

## 2023-07-23 RX ADMIN — OXYCODONE HYDROCHLORIDE 10 MG: 5 TABLET ORAL at 03:36

## 2023-07-23 RX ADMIN — PRIMIDONE 100 MG: 50 TABLET ORAL at 20:38

## 2023-07-23 RX ADMIN — ACETAMINOPHEN 650 MG: 325 TABLET ORAL at 19:30

## 2023-07-23 RX ADMIN — TIZANIDINE 4 MG: 2 TABLET ORAL at 20:37

## 2023-07-23 RX ADMIN — ENOXAPARIN SODIUM 40 MG: 40 INJECTION SUBCUTANEOUS at 17:26

## 2023-07-23 RX ADMIN — PRIMIDONE 100 MG: 50 TABLET ORAL at 14:50

## 2023-07-23 RX ADMIN — OXYCODONE HYDROCHLORIDE 5 MG: 5 TABLET ORAL at 17:26

## 2023-07-23 RX ADMIN — POTASSIUM CHLORIDE 10 MEQ: 750 TABLET, EXTENDED RELEASE ORAL at 08:06

## 2023-07-23 RX ADMIN — ALLOPURINOL 300 MG: 300 TABLET ORAL at 08:05

## 2023-07-23 RX ADMIN — OXYCODONE HYDROCHLORIDE 5 MG: 5 TABLET ORAL at 08:06

## 2023-07-23 RX ADMIN — SENNOSIDES AND DOCUSATE SODIUM 1 TABLET: 50; 8.6 TABLET ORAL at 08:21

## 2023-07-23 RX ADMIN — ACETAMINOPHEN 650 MG: 325 TABLET ORAL at 12:23

## 2023-07-23 RX ADMIN — FUROSEMIDE 40 MG: 20 TABLET ORAL at 08:06

## 2023-07-23 ASSESSMENT — ACTIVITIES OF DAILY LIVING (ADL)
ADLS_ACUITY_SCORE: 45
ADLS_ACUITY_SCORE: 47
ADLS_ACUITY_SCORE: 47
ADLS_ACUITY_SCORE: 45
ADLS_ACUITY_SCORE: 45
ADLS_ACUITY_SCORE: 47
ADLS_ACUITY_SCORE: 45
ADLS_ACUITY_SCORE: 47
ADLS_ACUITY_SCORE: 45
ADLS_ACUITY_SCORE: 45

## 2023-07-23 NOTE — PLAN OF CARE
Problem: Plan of Care - These are the overarching goals to be used throughout the patient stay.    Goal: Optimal Comfort and Wellbeing  Outcome: Progressing     Problem: Pain Acute  Goal: Optimal Pain Control and Function  Outcome: Progressing  Intervention: Prevent or Manage Pain  Recent Flowsheet Documentation  Taken 7/23/2023 0900 by Raphael Mooney, RN  Medication Review/Management: medications reviewed   Goal Outcome Evaluation:       Patient is A/O x4. BP elevated prior to antihypertensive administration but WDL upon recheck. Patient reports LLE pain 5/10 for which PRN pain medications have brought down to tolerable levels.     Patient BG under 100 this shift. Appetite good. Still not able to ambulate OOB, so purewick and bedpan utilized.

## 2023-07-23 NOTE — PROGRESS NOTES
07/23/23 1040   Appointment Info   Signing Clinician's Name / Credentials (OT) Carla Shepherd OTR/L   Quick Adds   Quick Adds Certification   Living Environment   People in Home child(corina), adult   Current Living Arrangements apartment   Home Accessibility no concerns   Transportation Anticipated family or friend will provide   Living Environment Comments Patient lives on the second floor of an apartment building with an elevator with her daugther.   Self-Care   Usual Activity Tolerance moderate   Current Activity Tolerance fair   Equipment Currently Used at Home walker, rolling   Activity/Exercise/Self-Care Comment Since surgery patient has been max A to dependent for ADLs. Prior to surgery was independent   Instrumental Activities of Daily Living (IADL)   IADL Comments Currently dependent since surgery but was independent including driving.   General Information   Onset of Illness/Injury or Date of Surgery 07/20/23   Referring Physician Kathy Escamilla MD   Patient/Family Therapy Goal Statement (OT) Further rehab to address concerns with mobility and weakness   Existing Precautions/Restrictions fall;weight bearing  (WBAT)   General Observations and Info Patient is WBAT on LLE and due to pain as well as limited strength from total knee replacmeent is max A to dependent with all ADLs/IADLs   Cognitive Status Examination   Orientation Status orientation to person, place and time   Visual Perception   Visual Impairment/Limitations WFL   Posture   Posture Comments Tendency to lean backwards with mobility of LLE to off set due to pain.   Range of Motion Comprehensive   General Range of Motion no range of motion deficits identified   Strength Comprehensive (MMT)   Comment, General Manual Muscle Testing (MMT) Assessment UE MMT 4-/5 for all areas of the shoulder and elbows   Coordination   Upper Extremity Coordination No deficits were identified   Coordination Comments Slow movements for coordination overall but  functional   Bed Mobility   Comment (Bed Mobility) Min A x 2 for bed mobility   Transfers   Transfer Comments Min A of 2 hand hold assist   Clinical Impression   Criteria for Skilled Therapeutic Interventions Met (OT) Yes, treatment indicated   OT Diagnosis 77 year old female presents to the Emergency Department for evaluation of weakness.  Patient with recent total knee surgery on Thursday.  She says her pain has been well controlled but she is feeling weak and has been unable to get out of bed for the last day and a half.  Her daughter takes care of her but has not been able to help her with her ADLs.  Patient called the ambulance today because she has not been able to function.  Denied chest pain no fever no belly pain no shortness of breath.  Was concern for the possibility of infection versus postop pain versus ACS versus simple failure to thrive.  Work-up here in the ER shows no UTI, there is normal blood work other than a slightly low magnesium.  EKG is normal.  Spoke with the care manager who felt that the patient would not likely be able to be placed in a TCU today.  Spoke at length with the patient regarding the fact that this may just be postop deconditioning and the patient states that she is uncomfortable being at home and when we attempted to road test her she was unable to move much.  Because of this we will admit her to the hospital for further care.   Influenced by the following impairments Progressive weakness and pain   OT Problem List-Impairments impacting ADL problems related to;activity tolerance impaired;balance;mobility;strength   Assessment of Occupational Performance 3-5 Performance Deficits   Planned Therapy Interventions (OT) ADL retraining;IADL retraining;balance training;bed mobility training;strengthening;transfer training;progressive activity/exercise   Clinical Decision Making Complexity (OT) moderate complexity   Risk & Benefits of therapy have been explained evaluation/treatment  results reviewed;patient   OT Total Evaluation Time   OT Eval, Moderate Complexity Minutes (22776) 10   Therapy Certification   Medical Diagnosis Progressive weakness and mobility post total knee replacement   Start of Care Date 07/23/23   Certification date from 07/23/23   Certification date to 07/31/23   OT Goals   Therapy Frequency (OT) Daily   OT Predicted Duration/Target Date for Goal Attainment 07/31/23   OT Goals Lower Body Dressing;Toilet Transfer/Toileting;Hygiene/Grooming;OT Goal 1   OT: Hygiene/Grooming minimal assist;while standing;within precautions   OT: Lower Body Dressing Supervision/stand-by assist;using adaptive equipment;within precautions   OT: Toilet Transfer/Toileting Minimal assist;within precautions   OT: Goal 1 Pt will participate in HEP for UE strenght to increase independence with ADLs.   Interventions   Interventions Quick Adds Self-Care/Home Management   Self-Care/Home Management   Self-Care/Home Mgmt/ADL, Compensatory, Meal Prep Minutes (04419) 13   Symptoms Noted During/After Treatment (Meal Preparation/Planning Training) fatigue;increased pain   Treatment Detail/Skilled Intervention Co-tx with PT. Patient participate in OT session to address functional mobility. Education provided on supine to seated EOB with regard to hand placement, progression of LE, and hands on assist. After cues, min A of 2. STS from EOB with min A of 2 hand hold assist with cues for LE placement and pulling pelvis over feet to allow for improvement body positioning with standing.   OT Discharge Planning   OT Plan Bed mobility, STS from EOB, short transfers, and LE dressing with AE   OT Discharge Recommendation (DC Rec) Transitional Care Facility   OT Rationale for DC Rec Patient is below baseline for ADLs/IADLs. She requires assist for all activities secondary to significant weakness. Concern for risk of injury/fall if patient returns to home environment. Pt would benefit from further rehab services to improve  functional mobility and ADL engagment.   OT Brief overview of current status Min A x 2 for STS from EOB hand hold assist   Total Session Time   Timed Code Treatment Minutes 13   Total Session Time (sum of timed and untimed services) 23    [facility]  OUTPATIENT OCCUPATIONAL THERAPY  EVALUATION  PLAN OF TREATMENT FOR OUTPATIENT REHABILITATION  (COMPLETE FOR INITIAL CLAIMS ONLY)  Patient's Last Name, First Name, M.I.  YOB: 1945  Lorie Garrido                          Provider's Name  [facility] Medical Record No.  [id number]                             Onset Date:  07/20/23   Start of Care Date:  07/23/23   Type:     ___PT   _X_OT   ___SLP Medical Diagnosis:  Progressive weakness and mobility post total knee replacement                    OT Diagnosis:  77 year old female presents to the Emergency Department for evaluation of weakness.  Patient with recent total knee surgery on Thursday.  She says her pain has been well controlled but she is feeling weak and has been unable to get out of bed for the last day and a half.  Her daughter takes care of her but has not been able to help her with her ADLs.  Patient called the ambulance today because she has not been able to function.  Denied chest pain no fever no belly pain no shortness of breath.  Was concern for the possibility of infection versus postop pain versus ACS versus simple failure to thrive.  Work-up here in the ER shows no UTI, there is normal blood work other than a slightly low magnesium.  EKG is normal.  Spoke with the care manager who felt that the patient would not likely be able to be placed in a TCU today.  Spoke at length with the patient regarding the fact that this may just be postop deconditioning and the patient states that she is uncomfortable being at home and when we attempted to road test her she was unable to move much.  Because of this we will admit her to the  [facility] for further care. Visits from SOC:  1     See note for plan of treatment, functional goals and certification details    I CERTIFY THE NEED FOR THESE SERVICES FURNISHED UNDER        THIS PLAN OF TREATMENT AND WHILE UNDER MY CARE     (Physician co-signature of this document indicates review and certification of the therapy plan).                         Carla Shepherd OTR/L

## 2023-07-23 NOTE — PROGRESS NOTES
07/23/23 1035   Appointment Info   Signing Clinician's Name / Credentials (PT) Dinora Quan, LEA   Quick Adds   Quick Adds Certification   Living Environment   People in Home child(corina), adult   Current Living Arrangements apartment  (2nd floor)   Home Accessibility no concerns  (elevator)   Transportation Anticipated family or friend will provide   Living Environment Comments Pt sleeps in a recliner.  Pt plans to use the walk in shower at home.   Self-Care   Current Activity Tolerance fair   Equipment Currently Used at Home walker, rolling  (4WW that she borrowed from a friend.)   Activity/Exercise/Self-Care Comment Indep prior to TKA surg on 7/20 and has assist from her daughter with mobility after that.  Limited walking per  the pt with the 4WW and just before admit she was not able to get up.   General Information   Onset of Illness/Injury or Date of Surgery 07/22/23  (L TKA 7/20/23)   Referring Physician Kathy Andujar.   Patient/Family Therapy Goals Statement (PT) To go to TCU   Pertinent History of Current Problem (include personal factors and/or comorbidities that impact the POC) Per the chart, Lorie Garrido is a 77 year old female admitted on 7/22/2023. She had TKA recently and not doing well at home. Presents with general weakness.   Existing Precautions/Restrictions fall   Weight-Bearing Status - LLE weight-bearing as tolerated   Weight-Bearing Status - RLE weight-bearing as tolerated   Cognition   Orientation Status (Cognition) oriented x 4   Pain Assessment   Patient Currently in Pain   (Pt did c/o L knee pain she rated at a 5/10 and did increase with mobility.)   Integumentary/Edema   Integumentary/Edema Comments Some edema a the left knee.   Range of Motion (ROM)   ROM Comment R LE WFL, L knee flex limited by pain.   Strength (Manual Muscle Testing)   Strength Comments R LE WFL and left hip and knee limited by pain.  Left hip 2-/5 left kness flex/ext 2-/5 and foot 4/5   Bed Mobility   Comment, (Bed  Mobility) Supine>sit with min A x 2,   Transfers   Comment, (Transfers) Sit<>stand with min A x2 with HHA x2   Gait/Stairs (Locomotion)   Comment, (Gait/Stairs) not tested only standing.   Balance   Balance Comments Min A x 2 with HHA x 2   Sensory Examination   Sensory Perception WNL   Sensory Perception Comments bilat LEs to light touch.  Pt does report neuropathy in the LEs.   Clinical Impression   Criteria for Skilled Therapeutic Intervention Yes, treatment indicated   PT Diagnosis (PT) impaired functional mobility   Influenced by the following impairments LE weakness, dec ROM, increased pain,, dec bal, dec endurance.   Functional limitations due to impairments bed mobility, transsfers, gait   Clinical Presentation (PT Evaluation Complexity) Stable/Uncomplicated   Clinical Presentation Rationale Pt presents medically diagnosed.   Clinical Decision Making (Complexity) moderate complexity   Planned Therapy Interventions (PT) bed mobility training;gait training;home exercise program;ROM (range of motion);strengthening;transfer training   Anticipated Equipment Needs at Discharge (PT) walker, rolling;wheelchair  (possible WC)   Risk & Benefits of therapy have been explained evaluation/treatment results reviewed;care plan/treatment goals reviewed;risks/benefits reviewed;current/potential barriers reviewed;patient;participants voiced agreement with care plan   PT Total Evaluation Time   PT Eval, Moderate Complexity Minutes (98967) 15   Therapy Certification   Start of care date 07/23/23   Certification date from 07/23/23   Certification date to 07/30/23   Medical Diagnosis Weakness.   Physical Therapy Goals   PT Frequency 2x/day   PT Predicted Duration/Target Date for Goal Attainment 07/30/23   PT Goals Bed Mobility;Transfers;Gait;PT Goal 1   PT: Bed Mobility Minimal assist;Supine to/from sit   PT: Transfers Moderate assist;Sit to/from stand;Bed to/from chair;Assistive device   PT: Gait Moderate assist;Rolling  walker;25 feet  (with WC follow)   PT: Goal 1 Pt to brayan 10 to 20 reps of TKA with CGA to increase AROM and strength for mobility.   Interventions   Interventions Quick Adds Therapeutic Activity;Therapeutic Procedure   Therapeutic Procedure/Exercise   Ther. Procedure: strength, endurance, ROM, flexibillity Minutes (56314) 8   Treatment Detail/Skilled Intervention Supine TKA ex, AP x 10 indep, bilat QS x 10 min A L LE, L HS with max A with not a lot of AROM from the pt.  L SLR with max A x 10 reps. Cues for more AROM and quality of movement.  Seated Bilat HF and LAQ with mod A L LE again with cues for better quality and increased AROM.   Therapeutic Activity   Treatment Detail/Skilled Intervention Supine>sit with min A x 2 with HOB elevated and the pt using the rail with cues for technique.  Pt needed assist with the LEs.  Sit<>stand with min A x 2 from the bed with HHA x 2. Pt did more WB on the R LE and did have the R LE blocked.  HHA x2.  Sit scoot to the HOB with CGA.  Pt was not able to clear her seat from the bed to well.  Sit>supine with max A x 2.   PT Discharge Planning   PT Plan bed mobility, sit<>stand with FWW with A x 2, Progress with mobility as brayan. A x2, TKA ex.   PT Discharge Recommendation (DC Rec) Transitional Care Facility   PT Rationale for DC Rec The pt does sig. weakness in the L LE with dec A/PROM in the left knee due to pain.  She does need A x 2 with bed mobility and A x 2 with sit<>stand.  Pt is not able to WB on the L LE well yet.  TCU is recommended.   PT Brief overview of current status PT eval, TKA ex,, bed mobility with A x 2 and sit<>stand with min A x 2 with HHA x2.  Pt not able to WB well on the L LE.   Total Session Time   Timed Code Treatment Minutes 8   Total Session Time (sum of timed and untimed services) 23   [facility]  OUTPATIENT PHYSICAL THERAPY EVALUATION  PLAN OF TREATMENT FOR OUTPATIENT REHABILITATION  (COMPLETE FOR INITIAL CLAIMS  ONLY)  Patient's Last Name, First Name, M.I.  YOB: 1945  Lorie Garrido                        Provider's Name  [facility] Medical Record No.  [id number]                             Onset Date:  07/22/23 (L TKA 7/20/23)   Start of Care Date:  07/23/23   Type:     _X_PT   ___OT   ___SLP Medical Diagnosis:  Weakness.              PT Diagnosis:  impaired functional mobility Visits from SOC:  1     See note for plan of treatment, functional goals and certification details    I CERTIFY THE NEED FOR THESE SERVICES FURNISHED UNDER        THIS PLAN OF TREATMENT AND WHILE UNDER MY CARE     (Physician co-signature of this document indicates review and certification of the therapy plan).

## 2023-07-23 NOTE — PLAN OF CARE
PRIMARY DIAGNOSIS: ACUTE PAIN  OUTPATIENT/OBSERVATION GOALS TO BE MET BEFORE DISCHARGE:  1. Pain Status: Improved-controlled with oral pain medications.    2. Return to near baseline physical activity: No    3. Cleared for discharge by consultants (if involved): No    Pts pain managed with prn tylenol, oxycodone, and ice packs.  Pt has limited strength and ROM to LLE and has not been out of bed.  PT to see.      Discharge Planner Nurse   Safe discharge environment identified: Yes  Barriers to discharge: Yes       Entered by: Zoey Diallo RN 07/23/2023 7:31 AM     Please review provider order for any additional goals.   Nurse to notify provider when observation goals have been met and patient is ready for discharge.

## 2023-07-23 NOTE — PLAN OF CARE
PRIMARY DIAGNOSIS: GENERALIZED WEAKNESS    OUTPATIENT/OBSERVATION GOALS TO BE MET BEFORE DISCHARGE  1. Orthostatic performed: N/A    2. Tolerating PO medications: Yes.  5mg oxycodone effective at managing pain.    3. Return to near baseline physical activity: No - limited mobility to LLE    4. Cleared for discharge by consultants (if involved): No    Discharge Planner Nurse   Safe discharge environment identified: Yes  Barriers to discharge: Yes       Entered by: Zoey Diallo RN 07/23/2023 12:00 AM     Please review provider order for any additional goals.   Nurse to notify provider when observation goals have been met and patient is ready for discharge.

## 2023-07-23 NOTE — PLAN OF CARE
"  Problem: Plan of Care - These are the overarching goals to be used throughout the patient stay.    Goal: Plan of Care Review  Description: The Plan of Care Review/Shift note should be completed every shift.  The Outcome Evaluation is a brief statement about your assessment that the patient is improving, declining, or no change.  This information will be displayed automatically on your shift note.  Outcome: Progressing  Goal: Patient-Specific Goal (Individualized)  Description: You can add care plan individualizations to a care plan. Examples of Individualization might be:  \"Parent requests to be called daily at 9am for status\", \"I have a hard time hearing out of my right ear\", or \"Do not touch me to wake me up as it startles me\".  Outcome: Progressing  Goal: Absence of Hospital-Acquired Illness or Injury  Outcome: Progressing  Goal: Optimal Comfort and Wellbeing  Outcome: Progressing  Goal: Readiness for Transition of Care  Outcome: Progressing   Goal Outcome Evaluation:       Pt requested pain medications for knee pain, rated an 8/10.  Rec'd prn oxycodone.  Sleeping at re-check.  Pt did receive insulin coverage for BG of 147.  Pt has been somewhat hypertensive, but had not received her home medications.  VSS.  Pt on purewick.  Pt reports unable to ambulate, EMS had to lift her out of her bed at home.  PT and OT to evaluate tomorrow.                   "

## 2023-07-23 NOTE — PROGRESS NOTES
PRIMARY DIAGNOSIS: GENERALIZED WEAKNESS    OUTPATIENT/OBSERVATION GOALS TO BE MET BEFORE DISCHARGE  1. Orthostatic performed: No    2. Tolerating PO medications: Yes    3. Return to near baseline physical activity: No    4. Cleared for discharge by consultants (if involved): No    Discharge Planner Nurse   Safe discharge environment identified: No  Barriers to discharge: Yes, placement       Entered by: Sharon Mon RN 07/23/2023 6:49 PM      Patient AAO. Pleasant and cooperative with writer. C/O discomfort in LLE rating pain 7/10, prn oxycodone given with some relief. On Mg protocol, 1.8 today, recheck in a.m. BG 90 just before dinner this evening.   Please review provider order for any additional goals.   Nurse to notify provider when observation goals have been met and patient is ready for discharge.

## 2023-07-23 NOTE — PROGRESS NOTES
[facility]    Medicine Progress Note - Hospitalist Service    Date of Admission:  7/22/2023    Assessment & Plan      Lorie Garrido is a 77 year old female admitted on 7/22/2023. She had TKA recently and not doing well at home. Presents with general weakness.    General weakness  -multi-factorial   -pt/ot for placement  -poor appetite and oral intake    Hypomagnesia  -replace as per protocol    H/o TKA left  Postop pain  -pain control    DM 2  -PTA metformin  -sliding scale insulin    HTN  Hyperlipidemia  -PTA meds    IBS  Collagenous colitis  -chronic diarrhea    Chronic legs edema       Diet: Combination Diet Moderate Consistent Carb (60 g CHO per Meal) Diet; No Caffeine Diet, Low Saturated Fat Na <2400mg Diet    DVT Prophylaxis: Enoxaparin (Lovenox) SQ  Palmer Catheter: Not present  Lines: None     Cardiac Monitoring: None  Code Status: Full Code      Clinically Significant Risk Factors Present on Admission           # Hypercalcemia: Highest Ca = 10.8 mg/dL in last 2 days, will monitor as appropriate  # Hypomagnesemia: Lowest Mg = 1.6 mg/dL in last 2 days, will replace as needed       # Hypertension: Home medication list includes antihypertensive(s)      # Obesity: Estimated body mass index is 39.18 kg/m  as calculated from the following:    Height as of this encounter: 1.524 m (5').    Weight as of this encounter: 91 kg (200 lb 9.9 oz).            Disposition Plan      Expected Discharge Date: 07/23/2023              await pt/ot/sw for safe disposition plan    Kathy Escamilla MD  Hospitalist Service  [facility]  Securely message with [employer] (more info)  Text page via [employer] Paging/Directory   ______________________________________________________________________    Interval History   Still weak in leg, no cp/sob, no n/v, no f/c; difficulty with walking    Physical Exam   Vital Signs: Temp: 97.7  F (36.5  C) Temp src: Oral BP: (!) 143/67 Pulse: 68   Resp: 20 SpO2:  97 % O2 Device: None (Room air)    Weight: 200 lbs 9.9 oz    General.  Awake alert oriented not in acute distress. Obese  HEENT.  Pupils equal round react to light, anicteric, EOM intact.  Neck supple no JVD.  CVS regular rhythm no murmur gallops.  Lungs.  Clear to auscultation bilateral no wheezing or rales.  Abdomen.  Soft nontender bowel sounds present.  Extremities.  + edema s/p left TKA  Neurological.  Awake and alert. No focal deficit.Weakness+  Skin no rash. No pallor.  Psych. Normal mood.     Medical Decision Making       45 MINUTES SPENT BY ME on the date of service doing chart review, history, exam, documentation & further activities per the note.      Data     I have personally reviewed the following data over the past 24 hrs:    8.5  \   9.6 (L)   / 325     141 108 (H) 11.5 /  98   3.8 23 0.62 \       ALT: 14 AST: 25 AP: 59 TBILI: 0.2   ALB: 3.7 TOT PROTEIN: 6.2 (L) LIPASE: N/A       Trop: 14 BNP: N/A       Procal: N/A CRP: N/A Lactic Acid: 1.1         Imaging results reviewed over the past 24 hrs:   No results found for this or any previous visit (from the past 24 hour(s)).

## 2023-07-24 ENCOUNTER — APPOINTMENT (OUTPATIENT)
Dept: OCCUPATIONAL THERAPY | Facility: HOSPITAL | Age: 78
End: 2023-07-24
Payer: COMMERCIAL

## 2023-07-24 ENCOUNTER — APPOINTMENT (OUTPATIENT)
Dept: PHYSICAL THERAPY | Facility: HOSPITAL | Age: 78
End: 2023-07-24
Payer: COMMERCIAL

## 2023-07-24 LAB
GLUCOSE BLDC GLUCOMTR-MCNC: 106 MG/DL (ref 70–99)
GLUCOSE BLDC GLUCOMTR-MCNC: 135 MG/DL (ref 70–99)
GLUCOSE BLDC GLUCOMTR-MCNC: 91 MG/DL (ref 70–99)
GLUCOSE BLDC GLUCOMTR-MCNC: 91 MG/DL (ref 70–99)
MAGNESIUM SERPL-MCNC: 1.7 MG/DL (ref 1.7–2.3)

## 2023-07-24 PROCEDURE — 97530 THERAPEUTIC ACTIVITIES: CPT | Mod: GP | Performed by: PHYSICAL THERAPIST

## 2023-07-24 PROCEDURE — 82962 GLUCOSE BLOOD TEST: CPT

## 2023-07-24 PROCEDURE — 99233 SBSQ HOSP IP/OBS HIGH 50: CPT | Performed by: INTERNAL MEDICINE

## 2023-07-24 PROCEDURE — 250N000013 HC RX MED GY IP 250 OP 250 PS 637: Performed by: INTERNAL MEDICINE

## 2023-07-24 PROCEDURE — 96372 THER/PROPH/DIAG INJ SC/IM: CPT | Performed by: INTERNAL MEDICINE

## 2023-07-24 PROCEDURE — 97116 GAIT TRAINING THERAPY: CPT | Mod: GP | Performed by: PHYSICAL THERAPIST

## 2023-07-24 PROCEDURE — G0378 HOSPITAL OBSERVATION PER HR: HCPCS

## 2023-07-24 PROCEDURE — 96376 TX/PRO/DX INJ SAME DRUG ADON: CPT

## 2023-07-24 PROCEDURE — 36415 COLL VENOUS BLD VENIPUNCTURE: CPT | Performed by: INTERNAL MEDICINE

## 2023-07-24 PROCEDURE — 250N000011 HC RX IP 250 OP 636: Mod: JZ | Performed by: INTERNAL MEDICINE

## 2023-07-24 PROCEDURE — 83735 ASSAY OF MAGNESIUM: CPT | Performed by: INTERNAL MEDICINE

## 2023-07-24 PROCEDURE — 96375 TX/PRO/DX INJ NEW DRUG ADDON: CPT

## 2023-07-24 PROCEDURE — 97535 SELF CARE MNGMENT TRAINING: CPT | Mod: GO

## 2023-07-24 PROCEDURE — 97110 THERAPEUTIC EXERCISES: CPT | Mod: GP | Performed by: PHYSICAL THERAPIST

## 2023-07-24 RX ORDER — BISMUTH SUBSALICYLATE 262 MG/1
524 TABLET, CHEWABLE ORAL 2 TIMES DAILY PRN
Status: DISCONTINUED | OUTPATIENT
Start: 2023-07-24 | End: 2023-07-25 | Stop reason: HOSPADM

## 2023-07-24 RX ORDER — HYDROMORPHONE HCL IN WATER/PF 6 MG/30 ML
0.2 PATIENT CONTROLLED ANALGESIA SYRINGE INTRAVENOUS
Status: DISCONTINUED | OUTPATIENT
Start: 2023-07-24 | End: 2023-07-25 | Stop reason: HOSPADM

## 2023-07-24 RX ORDER — AMOXICILLIN 250 MG
1 CAPSULE ORAL
Status: DISCONTINUED | OUTPATIENT
Start: 2023-07-24 | End: 2023-07-25 | Stop reason: HOSPADM

## 2023-07-24 RX ORDER — AMOXICILLIN 250 MG
2 CAPSULE ORAL
Status: DISCONTINUED | OUTPATIENT
Start: 2023-07-24 | End: 2023-07-25 | Stop reason: HOSPADM

## 2023-07-24 RX ADMIN — OXYCODONE HYDROCHLORIDE 5 MG: 5 TABLET ORAL at 04:00

## 2023-07-24 RX ADMIN — POTASSIUM CHLORIDE 10 MEQ: 750 TABLET, EXTENDED RELEASE ORAL at 08:10

## 2023-07-24 RX ADMIN — ENOXAPARIN SODIUM 40 MG: 40 INJECTION SUBCUTANEOUS at 17:28

## 2023-07-24 RX ADMIN — PRIMIDONE 100 MG: 50 TABLET ORAL at 08:13

## 2023-07-24 RX ADMIN — HYDROMORPHONE HYDROCHLORIDE 0.2 MG: 0.2 INJECTION, SOLUTION INTRAMUSCULAR; INTRAVENOUS; SUBCUTANEOUS at 15:03

## 2023-07-24 RX ADMIN — METFORMIN HYDROCHLORIDE 500 MG: 500 TABLET, FILM COATED ORAL at 08:10

## 2023-07-24 RX ADMIN — ALLOPURINOL 300 MG: 300 TABLET ORAL at 08:11

## 2023-07-24 RX ADMIN — GABAPENTIN 600 MG: 300 CAPSULE ORAL at 08:10

## 2023-07-24 RX ADMIN — TIZANIDINE 4 MG: 2 TABLET ORAL at 20:18

## 2023-07-24 RX ADMIN — FUROSEMIDE 40 MG: 20 TABLET ORAL at 08:10

## 2023-07-24 RX ADMIN — GABAPENTIN 600 MG: 300 CAPSULE ORAL at 20:18

## 2023-07-24 RX ADMIN — TIZANIDINE 4 MG: 2 TABLET ORAL at 08:11

## 2023-07-24 RX ADMIN — POTASSIUM CHLORIDE 10 MEQ: 750 TABLET, EXTENDED RELEASE ORAL at 17:28

## 2023-07-24 RX ADMIN — ACETAMINOPHEN 650 MG: 325 TABLET ORAL at 01:58

## 2023-07-24 RX ADMIN — ATORVASTATIN CALCIUM 40 MG: 40 TABLET, FILM COATED ORAL at 08:10

## 2023-07-24 RX ADMIN — HYDROMORPHONE HYDROCHLORIDE 0.2 MG: 0.2 INJECTION, SOLUTION INTRAMUSCULAR; INTRAVENOUS; SUBCUTANEOUS at 07:35

## 2023-07-24 RX ADMIN — LISINOPRIL 10 MG: 5 TABLET ORAL at 08:10

## 2023-07-24 RX ADMIN — OXYCODONE HYDROCHLORIDE 5 MG: 5 TABLET ORAL at 17:27

## 2023-07-24 RX ADMIN — METFORMIN HYDROCHLORIDE 500 MG: 500 TABLET, FILM COATED ORAL at 17:28

## 2023-07-24 RX ADMIN — PRIMIDONE 100 MG: 50 TABLET ORAL at 15:07

## 2023-07-24 RX ADMIN — PRIMIDONE 100 MG: 50 TABLET ORAL at 20:17

## 2023-07-24 RX ADMIN — OXYCODONE HYDROCHLORIDE 10 MG: 5 TABLET ORAL at 11:30

## 2023-07-24 RX ADMIN — SENNOSIDES AND DOCUSATE SODIUM 2 TABLET: 50; 8.6 TABLET ORAL at 08:11

## 2023-07-24 RX ADMIN — OXYCODONE HYDROCHLORIDE 10 MG: 5 TABLET ORAL at 22:56

## 2023-07-24 ASSESSMENT — ACTIVITIES OF DAILY LIVING (ADL)
ADLS_ACUITY_SCORE: 47
ADLS_ACUITY_SCORE: 43
ADLS_ACUITY_SCORE: 47
ADLS_ACUITY_SCORE: 43

## 2023-07-24 NOTE — UTILIZATION REVIEW
Concurrent stay review; Secondary Review Determination - [facility]        Under the authority of the Utilization Management Committee, the utilization review process indicated a secondary review on the above patient.  The review outcome is based on review of the medical records, discussions with staff, and applying clinical experience noted on the date of the review.        (x) Observation/outpatient Status Appropriate - Concurrent stay review       RATIONALE FOR DETERMINATION:   Lorie Garrido is a 77 yr old female who is s/p TKA left at a different facility.   She has been struggling with weakness at home and presented for placement in TCU.  She is medically stable. Working on safe discharge plan.    Patient delayed discharge is related to disposition, there is no medical necessity for inpatient admission at the time of this review. If there is a change in patient status, please resend for review.    The information on this document is developed by the utilization review team in order for the business office to ensure compliance.  This only denotes the appropriateness of proper admission status and does not reflect the quality of care rendered.       The definitions of Inpatient Status and Observation Status used in making the determination above are those provided in the CMS Coverage Manual, Chapter 1 and Chapter 6, section 70.4.       Sincerely,    Yohana Gracia MD

## 2023-07-24 NOTE — PLAN OF CARE
PRIMARY DIAGNOSIS: ACUTE PAIN  OUTPATIENT/OBSERVATION GOALS TO BE MET BEFORE DISCHARGE:  1. Pain Status: Improved but still requiring IV narcotics.    2. Return to near baseline physical activity: No    3. Cleared for discharge by consultants (if involved): No    Discharge Planner Nurse   Safe discharge environment identified: Yes  Barriers to discharge: Yes       Entered by: Slade Lockwood RN 07/24/2023 8:23 AM     Please review provider order for any additional goals.   Nurse to notify provider when observation goals have been met and patient is ready for discharge.Goal Outcome Evaluation:

## 2023-07-24 NOTE — PROGRESS NOTES
"[facility]    Medicine Progress Note - Hospitalist Service    Date of Admission:  7/22/2023    Assessment & Plan   Lorie Garrido is a 77 year old female admitted on 7/22/2023. She had TKA recently and not doing well at home. Presents with general weakness.    Overnight: c/o persistent knee pain    General weakness  -multi-factorial   -pt/ot for placement  -poor appetite and oral intake    Hypomagnesia  -replace as per protocol    H/o TKA left  Postop pain  -pain control: add Dilaudid iv prn    DM 2  -PTA metformin  -sliding scale insulin    HTN  Hyperlipidemia  -PTA meds    IBS  Collagenous colitis  -chronic diarrhea    Chronic legs edema  -PTA meds       Diet: Combination Diet Moderate Consistent Carb (60 g CHO per Meal) Diet; No Caffeine Diet, Low Saturated Fat Na <2400mg Diet    DVT Prophylaxis: Enoxaparin (Lovenox) SQ  Palmer Catheter: Not present  Lines: None     Cardiac Monitoring: None  Code Status: Full Code      Clinically Significant Risk Factors Present on Admission           # Hypercalcemia: Highest Ca = 10.8 mg/dL in last 2 days, will monitor as appropriate  # Hypomagnesemia: Lowest Mg = 1.6 mg/dL in last 2 days, will replace as needed       # Hypertension: Home medication list includes antihypertensive(s)      # Severe Obesity: Estimated body mass index is 190.52 kg/m  as calculated from the following:    Height as of this encounter: 0.68 m (2' 2.77\").    Weight as of this encounter: 88.1 kg (194 lb 3.6 oz).            Disposition Plan      Expected Discharge Date: 07/25/2023        Discharge Comments: Needs TCU placement          Kathy Escamilla MD  Hospitalist Service  [facility]  Securely message with [employer] (more info)  Text page via [facility] Paging/Directory   ______________________________________________________________________    Interval History   Persistent pain and still not able to walk well, no f/c, no cp/sob, no n/v.    Called daughter and " updated    Physical Exam   Vital Signs: Temp: 98.3  F (36.8  C) Temp src: Oral BP: (!) 174/74 Pulse: 67   Resp: 16 SpO2: 94 % O2 Device: None (Room air)    Weight: 194 lbs 3.6 oz    General.  Awake alert oriented not in acute distress. Obese  HEENT.  Pupils equal round react to light, anicteric, EOM intact.  Neck supple no JVD.  CVS regular rhythm no murmur gallops.  Lungs.  Clear to auscultation bilateral no wheezing or rales.  Abdomen.  Soft nontender bowel sounds present.  Extremities.  +edema no calf tenderness. Left s/p knee replaced  Neurological.  Awake and alert. No focal deficit. General weakness  Skin no rash. No pallor.  Psych. Normal mood.      Medical Decision Making       45 MINUTES SPENT BY ME on the date of service doing chart review, history, exam, documentation & further activities per the note.      Data         Imaging results reviewed over the past 24 hrs:   No results found for this or any previous visit (from the past 24 hour(s)).

## 2023-07-24 NOTE — PROGRESS NOTES
"Care Management Follow Up    Length of Stay (days): 0    Expected Discharge Date: 07/25/2023     Concerns to be Addressed:   discharge planning    Patient plan of care discussed at interdisciplinary rounds: Yes    Anticipated Discharge Disposition:       Anticipated Discharge Services:    Education Provided on the Discharge Plan:  Per Care Team   Patient/Family in Agreement with the Plan:  Yes    Referrals Placed by CM/SW:    Private pay costs discussed: Not applicable    Additional Information:  Chart reviewed.    CM updates:  Pt would like to try to go to TCU if it is covered by insurance. If not pt would be ok with home care services.     Pt preferred [place], or [place].       Writer sent TCU referrals today to see if pt would be covered, pending.       [facility] [place] going [place] [facility] [place] to see if pt qualifies. Awaiting call back. Out of network.       Social Hx:  \"Lorie lives in an apartment with her daughter Candice. They have elevator access. \"I borrowed a 4WW from a friend and have been using it lately for mobility\".   She is independent with ADLs and IADLs. \"I do drive, but I cannot drive now. My daughter Candice doesn't drive. Friends can possibly help with transportation if needed\". It insurance doesn't cover TCU.  She \"might be more interested in Skilled Home Care services instead of TCU\".   They will try to find a friend to transport or may need  Health transport.\"      Cm will continue to follow plan of care, review recommendations, and assist with any discharge needs anticipated.       Lorie Khan RN      "

## 2023-07-24 NOTE — PLAN OF CARE
PRIMARY DIAGNOSIS: ACUTE PAIN  OUTPATIENT/OBSERVATION GOALS TO BE MET BEFORE DISCHARGE:  1. Pain Status: Improved-controlled with oral pain medications.    2. Return to near baseline physical activity: No    3. Cleared for discharge by consultants (if involved): No    Discharge Planner Nurse   Safe discharge environment identified: Yes  Barriers to discharge: Yes       Entered by: Slade Lockwood RN 07/24/2023 6:19 PM     Please review provider order for any additional goals.   Nurse to notify provider when observation goals have been met and patient is ready for discharge.Goal Outcome Evaluation:    PRN Oxycodone and IV Dilaudid were given for pain which were effective per pt.

## 2023-07-24 NOTE — CARE PLAN
PRIMARY DIAGNOSIS: GENERALIZED WEAKNESS    OUTPATIENT/OBSERVATION GOALS TO BE MET BEFORE DISCHARGE  1. Orthostatic performed: No    2. Tolerating PO medications: Yes    3. Return to near baseline physical activity: No    4. Cleared for discharge by consultants (if involved): No    Discharge Planner Nurse   Safe discharge environment identified: Yes  Barriers to discharge: Yes       Entered by: Rocael Manning RN 07/23/2023 10:29 PM     Please review provider order for any additional goals.   Nurse to notify provider when observation goals have been met and patient is ready for discharge.      Pt AxO x4, VSS on room air, pain 7/10 PRN tylenol given x1 and Oxy x1, bedfast, purewick on, x1 BM, calm/pleasant, PRN peptobismol given for diarrhea, , no significant events this shift.

## 2023-07-24 NOTE — PLAN OF CARE
Goal Outcome Evaluation:  PRIMARY DIAGNOSIS: GENERALIZED WEAKNESS    OUTPATIENT/OBSERVATION GOALS TO BE MET BEFORE DISCHARGE  1. Orthostatic performed: No    2. Tolerating PO medications: Yes    3. Return to near baseline physical activity: No    4. Cleared for discharge by consultants (if involved): No    Discharge Planner Nurse   Safe discharge environment identified: No  Barriers to discharge: Yes       Entered by: Doreen Suarez RN 07/24/2023 5:05 AM  7/10 pain reported in L knee. PRN tylenol and oxycodone given and effective per patient. Bedpan used for loose BM. Purewick in place.  VSS.    Doreen Suarez RN

## 2023-07-24 NOTE — PROGRESS NOTES
PRIMARY DIAGNOSIS: ACUTE PAIN  OUTPATIENT/OBSERVATION GOALS TO BE MET BEFORE DISCHARGE:  1. Pain Status: Improved-controlled with oral pain medications.    2. Return to near baseline physical activity: No    3. Cleared for discharge by consultants (if involved): No    Discharge Planner Nurse   Safe discharge environment identified: No  Barriers to discharge: Yes       Entered by: Doreen Suarez RN 07/24/2023 1:38 AM

## 2023-07-24 NOTE — CONSULTS
See progress/consult note from today from Care Management .    Lorie Khan RN on 7/24/2023 at 11:56 AM

## 2023-07-25 ENCOUNTER — APPOINTMENT (OUTPATIENT)
Dept: PHYSICAL THERAPY | Facility: HOSPITAL | Age: 78
End: 2023-07-25
Payer: COMMERCIAL

## 2023-07-25 VITALS
DIASTOLIC BLOOD PRESSURE: 74 MMHG | HEART RATE: 60 BPM | SYSTOLIC BLOOD PRESSURE: 189 MMHG | OXYGEN SATURATION: 96 % | RESPIRATION RATE: 16 BRPM | WEIGHT: 194.22 LBS | BODY MASS INDEX: 44.95 KG/M2 | TEMPERATURE: 98.6 F | HEIGHT: 55 IN

## 2023-07-25 PROBLEM — K52.831 COLLAGENOUS COLITIS: Status: ACTIVE | Noted: 2022-11-18

## 2023-07-25 PROBLEM — E11.40 TYPE 2 DIABETES MELLITUS WITH DIABETIC NEUROPATHY, WITHOUT LONG-TERM CURRENT USE OF INSULIN (H): Chronic | Status: ACTIVE | Noted: 2020-02-12

## 2023-07-25 PROBLEM — R73.03 PREDIABETES: Status: ACTIVE | Noted: 2023-07-25

## 2023-07-25 PROBLEM — E21.3 HYPERPARATHYROIDISM (H): Status: ACTIVE | Noted: 2020-02-12

## 2023-07-25 PROBLEM — G62.9 NEUROPATHY: Status: ACTIVE | Noted: 2023-07-25

## 2023-07-25 LAB
GLUCOSE BLDC GLUCOMTR-MCNC: 101 MG/DL (ref 70–99)
GLUCOSE BLDC GLUCOMTR-MCNC: 103 MG/DL (ref 70–99)
GLUCOSE BLDC GLUCOMTR-MCNC: 95 MG/DL (ref 70–99)
MAGNESIUM SERPL-MCNC: 1.5 MG/DL (ref 1.7–2.3)
PLATELET # BLD AUTO: 350 10E3/UL (ref 150–450)

## 2023-07-25 PROCEDURE — 96376 TX/PRO/DX INJ SAME DRUG ADON: CPT

## 2023-07-25 PROCEDURE — 250N000011 HC RX IP 250 OP 636: Mod: JZ | Performed by: INTERNAL MEDICINE

## 2023-07-25 PROCEDURE — G0378 HOSPITAL OBSERVATION PER HR: HCPCS

## 2023-07-25 PROCEDURE — 97530 THERAPEUTIC ACTIVITIES: CPT | Mod: GP | Performed by: PHYSICAL THERAPIST

## 2023-07-25 PROCEDURE — 82962 GLUCOSE BLOOD TEST: CPT

## 2023-07-25 PROCEDURE — 99239 HOSP IP/OBS DSCHRG MGMT >30: CPT | Performed by: INTERNAL MEDICINE

## 2023-07-25 PROCEDURE — 36415 COLL VENOUS BLD VENIPUNCTURE: CPT | Performed by: INTERNAL MEDICINE

## 2023-07-25 PROCEDURE — 85049 AUTOMATED PLATELET COUNT: CPT | Performed by: INTERNAL MEDICINE

## 2023-07-25 PROCEDURE — 250N000013 HC RX MED GY IP 250 OP 250 PS 637: Performed by: INTERNAL MEDICINE

## 2023-07-25 PROCEDURE — 83735 ASSAY OF MAGNESIUM: CPT | Performed by: INTERNAL MEDICINE

## 2023-07-25 RX ORDER — OXYCODONE HYDROCHLORIDE 5 MG/1
5 TABLET ORAL EVERY 4 HOURS PRN
Qty: 20 TABLET | Refills: 0 | Status: SHIPPED | OUTPATIENT
Start: 2023-07-25 | End: 2023-07-26

## 2023-07-25 RX ORDER — HYDRALAZINE HYDROCHLORIDE 20 MG/ML
10 INJECTION INTRAMUSCULAR; INTRAVENOUS EVERY 6 HOURS PRN
Status: DISCONTINUED | OUTPATIENT
Start: 2023-07-25 | End: 2023-07-25 | Stop reason: HOSPADM

## 2023-07-25 RX ORDER — MAGNESIUM OXIDE 400 MG/1
400 TABLET ORAL 2 TIMES DAILY
DISCHARGE
Start: 2023-07-25 | End: 2023-08-01

## 2023-07-25 RX ORDER — MAGNESIUM SULFATE HEPTAHYDRATE 40 MG/ML
2 INJECTION, SOLUTION INTRAVENOUS ONCE
Status: COMPLETED | OUTPATIENT
Start: 2023-07-25 | End: 2023-07-25

## 2023-07-25 RX ADMIN — ATORVASTATIN CALCIUM 40 MG: 40 TABLET, FILM COATED ORAL at 07:55

## 2023-07-25 RX ADMIN — ALLOPURINOL 300 MG: 300 TABLET ORAL at 07:55

## 2023-07-25 RX ADMIN — OXYCODONE HYDROCHLORIDE 10 MG: 5 TABLET ORAL at 04:41

## 2023-07-25 RX ADMIN — METFORMIN HYDROCHLORIDE 500 MG: 500 TABLET, FILM COATED ORAL at 07:56

## 2023-07-25 RX ADMIN — LISINOPRIL 10 MG: 5 TABLET ORAL at 07:55

## 2023-07-25 RX ADMIN — POTASSIUM CHLORIDE 10 MEQ: 750 TABLET, EXTENDED RELEASE ORAL at 07:55

## 2023-07-25 RX ADMIN — MAGNESIUM SULFATE HEPTAHYDRATE 2 G: 40 INJECTION, SOLUTION INTRAVENOUS at 08:18

## 2023-07-25 RX ADMIN — OXYCODONE HYDROCHLORIDE 10 MG: 5 TABLET ORAL at 12:39

## 2023-07-25 RX ADMIN — TIZANIDINE 4 MG: 2 TABLET ORAL at 07:56

## 2023-07-25 RX ADMIN — FUROSEMIDE 40 MG: 20 TABLET ORAL at 07:55

## 2023-07-25 RX ADMIN — GABAPENTIN 600 MG: 300 CAPSULE ORAL at 07:57

## 2023-07-25 RX ADMIN — PRIMIDONE 100 MG: 50 TABLET ORAL at 07:56

## 2023-07-25 ASSESSMENT — ACTIVITIES OF DAILY LIVING (ADL)
ADLS_ACUITY_SCORE: 43

## 2023-07-25 NOTE — PROGRESS NOTES
[facility] GERIATRIC SERVICES    Code Status:  FULL CODE   Visit Type:   Chief Complaint   Patient presents with    TCU Admission     [facility] 7/22/2023 - 7/25/2023     Facility:  [facility] ([facility]) [[id number]]         HPI: Lorie Garrido is a 77 year old female who I am seeing today for admit to the TCU.  Patient recently hospitalized on 7/22/2023 secondary to DJD of the knees status post left TKA.  Patient presented with general weakness with failure to thrive at home.  Generalized weakness multifactorial secondary to chronic knee pain with DJD, poor appetite and oral intake.  Patient with history of TKA of the left.  Postoperative pain control oxycodone.  Hypomagnesia replaced.  Diabetes mellitus type 2 on metformin.  Hypertension satisfactory.  IBS with chronic diarrhea.  Chronic leg edema on Lasix.  Peripheral neuropathy on gabapentin.    Transitional Care Course: Today patient sitting up in wheelchair.  She had surgery dressing to left knee.  Pain controlled with Tylenol and oxycodone.  Postoperatively 9.6. I do note her magnesium is slightly low at 1.5.  She is previously on mag supplement however this ended after 7 days.  Underlying IBS with chronic diarrhea.  She reports her appetite is fair.  She is emptying her bladder.  She denies any shortness of breath or chest pain.  She does have chronic lower extremity edema.      Assessment/Plan:     History of total knee arthroplasty, left  Osteoarthritis of both knees, unspecified osteoarthritis type  -Leave surgical bandage in place until follow-up with Ortho.  -Okay for PT OT eval and treat.  -Continue Tylenol and oxycodone for pain.  -Postoperative hemoglobin 9.6.    Hypomagnesemia  -Magnesium slightly low at 1.5.  She is now off her supplement.  -Resume magnesium 400 mg twice daily.  -Follow-up magnesium level Monday.    Type 2 diabetes mellitus with diabetic neuropathy, without long-term current use of insulin (H)  -Metformin 500 mg 2 times  daily.  -Blood sugar checks twice daily.  -Consistent carb diet.    Collagenous colitis  -Chronic loose stools.  -Bentyl 20 mg 3 times daily as needed.    Neuropathy  -Gabapentin 600 mg twice daily.        Active Ambulatory Problems     Diagnosis Date Noted    Weakness 07/22/2023    Collagenous colitis 11/18/2022    Hyperlipidemia 11/04/2006    Hyperparathyroidism (H) 02/12/2020    Hypertension 11/23/2004    Morbid obesity (H) 10/03/2012    Type 2 diabetes mellitus with diabetic neuropathy, without long-term current use of insulin (H) 02/12/2020    Allergic rhinitis 11/23/2004    Carpal tunnel syndrome 09/16/2004    Vitamin D deficiency 05/02/2011    Sciatica 11/23/2004    Restless legs syndrome 06/12/2008    Prediabetes 07/25/2023    Neuropathy 07/25/2023    Iron deficiency 06/12/2008    Gout 11/23/2004    Edema 11/23/2004    Coronary atherosclerosis 03/30/2005     Resolved Ambulatory Problems     Diagnosis Date Noted    No Resolved Ambulatory Problems     No Additional Past Medical History     Allergies   Allergen Reactions    Naproxen Nausea    Penicillins Hives    Triamterene Rash       All Meds and Allergies reviewed in the record at the facility and is the most up-to-date.    Post Discharge Medication Reconciliation Status: discharge medications reconciled and changed, per note/orders  Current Outpatient Medications   Medication Sig    acetaminophen (TYLENOL) 325 MG tablet Take 650 mg by mouth 3 times daily as needed for mild pain    alendronate (FOSAMAX) 70 MG tablet Take 70 mg by mouth every 7 days    allopurinol (ZYLOPRIM) 300 MG tablet Take 300 mg by mouth daily    atorvastatin (LIPITOR) 40 MG tablet Take 40 mg by mouth daily    dicyclomine (BENTYL) 20 MG tablet Take 20 mg by mouth 3 times daily as needed    furosemide (LASIX) 40 MG tablet Take 40 mg by mouth daily    gabapentin (NEURONTIN) 300 MG capsule Take 600 mg by mouth 2 times daily    lisinopril (ZESTRIL) 10 MG tablet Take 10 mg by mouth daily     "metFORMIN (GLUCOPHAGE) 500 MG tablet Take 500 mg by mouth 2 times daily (with meals)    omega-3 acid ethyl esters (LOVAZA) 1 g capsule Take 1 g by mouth daily    oxyCODONE (ROXICODONE) 5 MG tablet Take 1 tablet (5 mg) by mouth every 4 hours as needed for severe pain    potassium chloride ER (KLOR-CON M) 10 MEQ CR tablet Take 10 mEq by mouth 2 times daily    primidone (MYSOLINE) 50 MG tablet Take 100 mg by mouth 3 times daily    tiZANidine (ZANAFLEX) 4 MG tablet Take 4 mg by mouth 2 times daily    magnesium oxide (MAG-OX) 400 MG tablet Take 1 tablet (400 mg) by mouth 2 times daily for 7 days (Patient not taking: Reported on 7/26/2023)     No current facility-administered medications for this visit.       REVIEW OF SYSTEMS:   10 point review of systems reviewed and pertinent positives in the HPI.     PHYSICAL EXAMINATION:  Physical Exam     Vital signs: BP (!) 145/64   Pulse 64   Temp 98.1  F (36.7  C)   Resp 16   Ht 1.549 m (5' 1\")   Wt 85.2 kg (187 lb 12.8 oz)   SpO2 97%   BMI 35.48 kg/m    General: Awake, Alert, oriented x3, sitting up in wheelchair, appropriately, follows simple commands, conversant  HEENT:Pink conjunctiva, moist oral mucosa  NECK: Supple  CVS:  S1  S2, without murmur or gallop.   LUNG: Clear to auscultation, No wheezes, rales or rhonci.  BACK: No kyphosis of the thoracic spine  ABDOMEN: Soft, obese, nontender to palpation, with positive bowel sounds  EXTREMITIES: Moves both upper and lower extremities limited to the left lower extremity, 2+ pedal edema, which extremities, no calf tenderness.  Positive dorsi and plantarflexion.  SKIN: Warm and dry, surgical bandage placed to left knee.  NEUROLOGIC: Intact, pulses palpable  PSYCHIATRIC: Flat affect.      Labs:  All labs reviewed in the nursing home record and [employer]   @  Lab Results   Component Value Date    WBC 8.5 07/22/2023     Lab Results   Component Value Date    RBC 3.88 07/22/2023     Lab Results   Component Value Date    HGB 9.6 " 07/22/2023     Lab Results   Component Value Date    HCT 31.7 07/22/2023     Lab Results   Component Value Date    MCV 82 07/22/2023     Lab Results   Component Value Date    MCH 24.7 07/22/2023     Lab Results   Component Value Date    MCHC 30.3 07/22/2023     Lab Results   Component Value Date    RDW 17.5 07/22/2023     Lab Results   Component Value Date     07/25/2023        @Last Comprehensive Metabolic Panel:  Sodium   Date Value Ref Range Status   07/22/2023 141 136 - 145 mmol/L Final     Potassium   Date Value Ref Range Status   07/22/2023 3.8 3.4 - 5.3 mmol/L Final     Chloride   Date Value Ref Range Status   07/22/2023 108 (H) 98 - 107 mmol/L Final     Carbon Dioxide (CO2)   Date Value Ref Range Status   07/22/2023 23 22 - 29 mmol/L Final     Anion Gap   Date Value Ref Range Status   07/22/2023 10 7 - 15 mmol/L Final     GLUCOSE BY METER POCT   Date Value Ref Range Status   07/25/2023 101 (H) 70 - 99 mg/dL Final     Urea Nitrogen   Date Value Ref Range Status   07/22/2023 11.5 8.0 - 23.0 mg/dL Final     Creatinine   Date Value Ref Range Status   07/22/2023 0.62 0.51 - 0.95 mg/dL Final     GFR Estimate   Date Value Ref Range Status   07/22/2023 >90 >60 mL/min/1.73m2 Final     Calcium   Date Value Ref Range Status   07/22/2023 10.8 (H) 8.8 - 10.2 mg/dL Final     45 minutes spent preparing for this visit, reviewing hospitalization records, reviewing imaging, reviewing labs, reviewing medications as well as face-to-face time spent with patient and collaborating with nursing staff.     At the conclusion of the encounter I discussed  the results of all of the tests and the disposition with patient.   All questions were answered.  The patient acknowledged understanding and was involved in the decision making regarding the overall care plan.        This note has been dictated using voice recognition software. Any grammatical or context distortions are unintentional and inherent to the  software    Electronically signed by: Aleshia Odom, CNP

## 2023-07-25 NOTE — PLAN OF CARE
Physical Therapy Discharge Summary    Reason for therapy discharge:    Discharged to transitional care facility.    Progress towards therapy goal(s). See goals on Care Plan in [facility] electronic health record for goal details.  Goals partially met.  Barriers to achieving goals:   discharge from facility.    Therapy recommendation(s):    Continued therapy is recommended.  Rationale/Recommendations:  recommend continued PT at TCU.    Ellyn Myles, PT  7/25/2023

## 2023-07-25 NOTE — PLAN OF CARE
Occupational Therapy Discharge Summary    Reason for therapy discharge:    Discharged to transitional care facility.    Progress towards therapy goal(s). See goals on Care Plan in [facility] electronic health record for goal details.  Goals partially met.  Barriers to achieving goals:   discharge from facility.    Therapy recommendation(s):    Continued therapy is recommended.  Rationale/Recommendations:  to maximize ADL independence.

## 2023-07-25 NOTE — PROGRESS NOTES
Care Management Discharge Note    Discharge Date: 07/25/2023       Discharge Disposition: Transitional Care    Discharge Services: None    Discharge DME: None    Discharge Transportation: family or friend will provide    Private pay costs discussed: transportation costs    Does the patient's insurance plan have a 3 day qualifying hospital stay waiver?  No    PAS Confirmation Code:  [id number]  Patient/family educated on Medicare website which has current facility and service quality ratings:  yes    Education Provided on the Discharge Plan: Yes  Persons Notified of Discharge Plans: patient, MD, RN, JIGNESH, facility  Patient/Family in Agreement with the Plan: yes    Handoff Referral Completed: Yes    Additional Information:  [facility] called, they can accept patient's insurance,  no need to have an inpatient stay for coverage.      Patient notified, happy about acceptance to [facility] TCU.    RNCM set up ride for patient with [facility] wheelchair.   window: [phone].  Notified facility, patient, RN, JIGNESH    Discharge orders sent to [facility].    No additional CM needs identified.     Donna Caraballo RN

## 2023-07-25 NOTE — PROGRESS NOTES
PRIMARY DIAGNOSIS: GENERALIZED WEAKNESS    OUTPATIENT/OBSERVATION GOALS TO BE MET BEFORE DISCHARGE  1. Orthostatic performed: No    2. Tolerating PO medications: Yes    3. Return to near baseline physical activity: No    4. Cleared for discharge by consultants (if involved): No    Discharge Planner Nurse   Safe discharge environment identified: No  Barriers to discharge: Yes       Entered by: Cecelia Garcia RN 07/25/2023 2:47 AM

## 2023-07-25 NOTE — PLAN OF CARE
"  Problem: Plan of Care - These are the overarching goals to be used throughout the patient stay.    Goal: Plan of Care Review  Description: The Plan of Care Review/Shift note should be completed every shift.  The Outcome Evaluation is a brief statement about your assessment that the patient is improving, declining, or no change.  This information will be displayed automatically on your shift note.  Outcome: Adequate for Care Transition  Flowsheets (Taken 7/25/2023 5116)  Plan of Care Reviewed With: patient  Overall Patient Progress: improving  Goal: Patient-Specific Goal (Individualized)  Description: You can add care plan individualizations to a care plan. Examples of Individualization might be:  \"Parent requests to be called daily at 9am for status\", \"I have a hard time hearing out of my right ear\", or \"Do not touch me to wake me up as it startles me\".  Outcome: Adequate for Care Transition  Goal: Absence of Hospital-Acquired Illness or Injury  Outcome: Adequate for Care Transition  Goal: Optimal Comfort and Wellbeing  Outcome: Adequate for Care Transition  Goal: Readiness for Transition of Care  Outcome: Adequate for Care Transition     Problem: Pain Acute  Goal: Optimal Pain Control and Function  Outcome: Adequate for Care Transition   Goal Outcome Evaluation:      Plan of Care Reviewed With: patient    Overall Patient Progress: improving/Adequate for Discharge and discharge was discussed with patient.            "

## 2023-07-25 NOTE — PROGRESS NOTES
PRIMARY DIAGNOSIS: GENERALIZED WEAKNESS    OUTPATIENT/OBSERVATION GOALS TO BE MET BEFORE DISCHARGE  1. Orthostatic performed: No    2. Tolerating PO medications: Yes    3. Return to near baseline physical activity: No    4. Cleared for discharge by consultants (if involved): No    Discharge Planner Nurse   Safe discharge environment identified: No  Barriers to discharge: Yes       Entered by: Cecelia Garcia RN 07/25/2023 2:48 AM       Pt reported 7/10 left knee pain. PRN Oxycodone given with relief. BG of 135 and 103.

## 2023-07-25 NOTE — DISCHARGE SUMMARY
[facility]    Discharge Summary  Hospitalist    Date of Admission:  7/22/2023  Date of Discharge:  7/25/2023  Discharging Provider: Kathy Escamilla MD  Date of Service (when I saw the patient): 07/25/23    Discharge Diagnoses   General weakness  S/p TKA left  Hypomagnesia    History of Present Illness   Lorie Garrido is an 77 year old female who presented with general weakness, fail to thrive at home s/p left TKA.    Hospital Course   Lorie Garrido is a 77 year old female admitted on 7/22/2023. She had TKA recently and not doing well at home. Presents with general weakness.     General weakness  -multi-factorial   -pt/ot for placement  -poor appetite and oral intake     Hypomagnesia  -replace as per protocol  -Mag oxid bid x 7 days at discharge     H/o TKA left  Postop pain  -pain control: oxycodone     DM 2  -PTA metformin     HTN  Hyperlipidemia  -PTA meds     IBS  Collagenous colitis  -chronic diarrhea     Chronic legs edema  -PTA meds      Kathy Escamilla MD    Significant Results and Procedures   See below    Pending Results     Unresulted Labs Ordered in the Past 30 Days of this Admission       No orders found from 6/22/2023 to 7/23/2023.            Code Status   Full Code       Primary Care Physician   [facility]    General.  Awake alert oriented not in acute distress. obese  HEENT.  Pupils equal round react to light, anicteric, EOM intact.  Neck supple no JVD.  CVS regular rhythm no murmur gallops.  Lungs.  Clear to auscultation bilateral no wheezing or rales.  Abdomen.  Soft nontender bowel sounds present.  Extremities.  +edema no calf tenderness. Left TKA  Neurological.  Awake and alert. No focal deficit. General weakness  Skin no rash. No pallor.  Psych. Normal mood.      Discharge Disposition   Discharged to TCU  Condition at discharge: Stable    Consultations This Hospital Stay   CARE MANAGEMENT / SOCIAL WORK IP CONSULT  PHYSICAL THERAPY ADULT IP  CONSULT  OCCUPATIONAL THERAPY ADULT IP CONSULT  SOCIAL WORK IP CONSULT  PHYSICAL THERAPY ADULT IP CONSULT  OCCUPATIONAL THERAPY ADULT IP CONSULT    Time Spent on this Encounter   I, Kathy Escamilla MD, personally saw the patient today and spent greater than 30 minutes discharging this patient.    Discharge Orders      General info for SNF    Length of Stay Estimate: Short Term Care: Estimated # of Days <30  Condition at Discharge: Improving  Level of care:skilled   Rehabilitation Potential: Good  Admission H&P remains valid and up-to-date: Yes  Recent Chemotherapy: N/A  Use Nursing Home Standing Orders: Yes     Mantoux instructions    Give two-step Mantoux (PPD) Per Facility Policy Yes     Follow Up and recommended labs and tests    Follow up with Nursing home physician.  No follow up labs or test are needed.     Reason for your hospital stay    General weakness   S/p left TKA  Hypomagnesia     Glucose monitor nursing POCT    Before meals and at bedtime     Activity - Up with nursing assistance     Physical Therapy Adult Consult    Evaluate and treat as clinically indicated.    Reason: s/p TKA, weakness     Occupational Therapy Adult Consult    Evaluate and treat as clinically indicated.    Reason:  s/p TKA, weakness     Fall precautions     Diet    Follow this diet upon discharge: Orders Placed This Encounter      Combination Diet Moderate Consistent Carb (60 g CHO per Meal) Diet; No Caffeine Diet, Low Saturated Fat Na <2400mg Diet     Discharge Medications   Current Discharge Medication List        START taking these medications    Details   magnesium oxide (MAG-OX) 400 MG tablet Take 1 tablet (400 mg) by mouth 2 times daily for 7 days    Associated Diagnoses: Hypomagnesemia           CONTINUE these medications which have CHANGED    Details   oxyCODONE (ROXICODONE) 5 MG tablet Take 1 tablet (5 mg) by mouth every 4 hours as needed for severe pain  Qty: 20 tablet, Refills: 0    Associated Diagnoses: Status post total  left knee replacement           CONTINUE these medications which have NOT CHANGED    Details   alendronate (FOSAMAX) 70 MG tablet Take 70 mg by mouth every 7 days      allopurinol (ZYLOPRIM) 300 MG tablet Take 300 mg by mouth daily      atorvastatin (LIPITOR) 40 MG tablet Take 40 mg by mouth daily      dicyclomine (BENTYL) 20 MG tablet Take 20 mg by mouth 3 times daily as needed      furosemide (LASIX) 40 MG tablet Take 40 mg by mouth daily      gabapentin (NEURONTIN) 300 MG capsule Take 600 mg by mouth 2 times daily      lisinopril (ZESTRIL) 10 MG tablet Take 10 mg by mouth daily      metFORMIN (GLUCOPHAGE) 500 MG tablet Take 500 mg by mouth 2 times daily (with meals)      omega-3 acid ethyl esters (LOVAZA) 1 g capsule Take 1 g by mouth daily      potassium chloride ER (KLOR-CON M) 10 MEQ CR tablet Take 10 mEq by mouth 2 times daily      primidone (MYSOLINE) 50 MG tablet Take 100 mg by mouth 3 times daily      tiZANidine (ZANAFLEX) 4 MG tablet Take 4 mg by mouth 2 times daily           Allergies   Allergies   Allergen Reactions    Naproxen Nausea    Penicillins Hives    Triamterene Rash     Data   Most Recent 3 CBC's:  Recent Labs   Lab Test 07/25/23  0603 07/22/23  1145   WBC  --  8.5   HGB  --  9.6*   MCV  --  82    325      Most Recent 3 BMP's:  Recent Labs   Lab Test 07/25/23  1046 07/25/23  0718 07/25/23  0216 07/22/23  1645 07/22/23  1145   NA  --   --   --   --  141   POTASSIUM  --   --   --   --  3.8   CHLORIDE  --   --   --   --  108*   CO2  --   --   --   --  23   BUN  --   --   --   --  11.5   CR  --   --   --   --  0.62   ANIONGAP  --   --   --   --  10   SOTO  --   --   --   --  10.8*   * 95 103*   < > 122*    < > = values in this interval not displayed.     Most Recent 2 LFT's:  Recent Labs   Lab Test 07/22/23  1145   AST 25   ALT 14   ALKPHOS 59   BILITOTAL 0.2     Most Recent INR's and Anticoagulation Dosing History:  Anticoagulation Dose History           No data to display               Most Recent 3 Troponin's:No lab results found.  Most Recent Cholesterol Panel:No lab results found.  Most Recent 6 Bacteria Isolates From Any Culture (See EPIC Reports for Culture Details):No lab results found.  Most Recent TSH, T4 and A1c Labs:No lab results found.  Results for orders placed or performed in visit on 05/25/21   CT Head w/o Contrast    Narrative    See Historical Hospital Medical Record for documentation

## 2023-07-25 NOTE — PROGRESS NOTES
PRIMARY DIAGNOSIS: GENERALIZED WEAKNESS    OUTPATIENT/OBSERVATION GOALS TO BE MET BEFORE DISCHARGE  1. Orthostatic performed: No    2. Tolerating PO medications: Yes    3. Return to near baseline physical activity: No    4. Cleared for discharge by consultants (if involved): No    Discharge Planner Nurse   Safe discharge environment identified: No  Barriers to discharge: Yes       Entered by: Cecelia Garcia RN 07/25/2023 6:36 AM     Please review provider order for any additional goals.   Nurse to notify provider when observation goals have been met and patient is ready for discharge.

## 2023-07-26 ENCOUNTER — TRANSITIONAL CARE UNIT VISIT (OUTPATIENT)
Dept: GERIATRICS | Facility: CLINIC | Age: 78
End: 2023-07-26
Payer: COMMERCIAL

## 2023-07-26 VITALS
OXYGEN SATURATION: 97 % | HEART RATE: 64 BPM | RESPIRATION RATE: 16 BRPM | HEIGHT: 61 IN | SYSTOLIC BLOOD PRESSURE: 145 MMHG | TEMPERATURE: 98.1 F | WEIGHT: 187.8 LBS | BODY MASS INDEX: 35.45 KG/M2 | DIASTOLIC BLOOD PRESSURE: 64 MMHG

## 2023-07-26 DIAGNOSIS — K52.831 COLLAGENOUS COLITIS: ICD-10-CM

## 2023-07-26 DIAGNOSIS — M17.0 OSTEOARTHRITIS OF BOTH KNEES, UNSPECIFIED OSTEOARTHRITIS TYPE: ICD-10-CM

## 2023-07-26 DIAGNOSIS — Z96.652 STATUS POST TOTAL LEFT KNEE REPLACEMENT: ICD-10-CM

## 2023-07-26 DIAGNOSIS — G62.9 NEUROPATHY: ICD-10-CM

## 2023-07-26 DIAGNOSIS — E83.42 HYPOMAGNESEMIA: ICD-10-CM

## 2023-07-26 DIAGNOSIS — Z96.652 HISTORY OF TOTAL KNEE ARTHROPLASTY, LEFT: Primary | ICD-10-CM

## 2023-07-26 DIAGNOSIS — E11.40 TYPE 2 DIABETES MELLITUS WITH DIABETIC NEUROPATHY, WITHOUT LONG-TERM CURRENT USE OF INSULIN (H): Chronic | ICD-10-CM

## 2023-07-26 PROCEDURE — 99310 SBSQ NF CARE HIGH MDM 45: CPT | Performed by: NURSE PRACTITIONER

## 2023-07-26 RX ORDER — ACETAMINOPHEN 325 MG/1
650 TABLET ORAL 3 TIMES DAILY PRN
COMMUNITY
Start: 2023-07-26

## 2023-07-26 RX ORDER — OXYCODONE HYDROCHLORIDE 5 MG/1
5 TABLET ORAL EVERY 4 HOURS PRN
Qty: 30 TABLET | Refills: 0 | Status: SHIPPED | OUTPATIENT
Start: 2023-07-26 | End: 2023-08-01

## 2023-07-26 NOTE — LETTER
7/26/2023        RE: Lorie Garrido  [address]  [facility] [address]        [facility]    Code Status:  FULL CODE   Visit Type:   Chief Complaint   Patient presents with     TCU Admission     [facility] 7/22/2023 - 7/25/2023     Facility:  [facility] ([facility]) [[id number]]         HPI: Lorie Garrido is a 77 year old female who I am seeing today for admit to the TCU.  Patient recently hospitalized on 7/22/2023 secondary to DJD of the knees status post left TKA.  Patient presented with general weakness with failure to thrive at home.  Generalized weakness multifactorial secondary to chronic knee pain with DJD, poor appetite and oral intake.  Patient with history of TKA of the left.  Postoperative pain control oxycodone.  Hypomagnesia replaced.  Diabetes mellitus type 2 on metformin.  Hypertension satisfactory.  IBS with chronic diarrhea.  Chronic leg edema on Lasix.  Peripheral neuropathy on gabapentin.    Transitional Care Course: Today patient sitting up in wheelchair.  She had surgery dressing to left knee.  Pain controlled with Tylenol and oxycodone.  Postoperatively 9.6. I do note her magnesium is slightly low at 1.5.  She is previously on mag supplement however this ended after 7 days.  Underlying IBS with chronic diarrhea.  She reports her appetite is fair.  She is emptying her bladder.  She denies any shortness of breath or chest pain.  She does have chronic lower extremity edema.      Assessment/Plan:     History of total knee arthroplasty, left  Osteoarthritis of both knees, unspecified osteoarthritis type  -Leave surgical bandage in place until follow-up with Ortho.  -Okay for PT OT eval and treat.  -Continue Tylenol and oxycodone for pain.  -Postoperative hemoglobin 9.6.    Hypomagnesemia  -Magnesium slightly low at 1.5.  She is now off her supplement.  -Resume magnesium 400 mg twice daily.  -Follow-up magnesium level Monday.    Type 2 diabetes mellitus with diabetic neuropathy,  without long-term current use of insulin (H)  -Metformin 500 mg 2 times daily.  -Blood sugar checks twice daily.  -Consistent carb diet.    Collagenous colitis  -Chronic loose stools.  -Bentyl 20 mg 3 times daily as needed.    Neuropathy  -Gabapentin 600 mg twice daily.        Active Ambulatory Problems     Diagnosis Date Noted     Weakness 07/22/2023     Collagenous colitis 11/18/2022     Hyperlipidemia 11/04/2006     Hyperparathyroidism (H) 02/12/2020     Hypertension 11/23/2004     Morbid obesity (H) 10/03/2012     Type 2 diabetes mellitus with diabetic neuropathy, without long-term current use of insulin (H) 02/12/2020     Allergic rhinitis 11/23/2004     Carpal tunnel syndrome 09/16/2004     Vitamin D deficiency 05/02/2011     Sciatica 11/23/2004     Restless legs syndrome 06/12/2008     Prediabetes 07/25/2023     Neuropathy 07/25/2023     Iron deficiency 06/12/2008     Gout 11/23/2004     Edema 11/23/2004     Coronary atherosclerosis 03/30/2005     Resolved Ambulatory Problems     Diagnosis Date Noted     No Resolved Ambulatory Problems     No Additional Past Medical History     Allergies   Allergen Reactions     Naproxen Nausea     Penicillins Hives     Triamterene Rash       All Meds and Allergies reviewed in the record at the facility and is the most up-to-date.    Post Discharge Medication Reconciliation Status: discharge medications reconciled and changed, per note/orders  Current Outpatient Medications   Medication Sig     acetaminophen (TYLENOL) 325 MG tablet Take 650 mg by mouth 3 times daily as needed for mild pain     alendronate (FOSAMAX) 70 MG tablet Take 70 mg by mouth every 7 days     allopurinol (ZYLOPRIM) 300 MG tablet Take 300 mg by mouth daily     atorvastatin (LIPITOR) 40 MG tablet Take 40 mg by mouth daily     dicyclomine (BENTYL) 20 MG tablet Take 20 mg by mouth 3 times daily as needed     furosemide (LASIX) 40 MG tablet Take 40 mg by mouth daily     gabapentin (NEURONTIN) 300 MG capsule  "Take 600 mg by mouth 2 times daily     lisinopril (ZESTRIL) 10 MG tablet Take 10 mg by mouth daily     metFORMIN (GLUCOPHAGE) 500 MG tablet Take 500 mg by mouth 2 times daily (with meals)     omega-3 acid ethyl esters (LOVAZA) 1 g capsule Take 1 g by mouth daily     oxyCODONE (ROXICODONE) 5 MG tablet Take 1 tablet (5 mg) by mouth every 4 hours as needed for severe pain     potassium chloride ER (KLOR-CON M) 10 MEQ CR tablet Take 10 mEq by mouth 2 times daily     primidone (MYSOLINE) 50 MG tablet Take 100 mg by mouth 3 times daily     tiZANidine (ZANAFLEX) 4 MG tablet Take 4 mg by mouth 2 times daily     magnesium oxide (MAG-OX) 400 MG tablet Take 1 tablet (400 mg) by mouth 2 times daily for 7 days (Patient not taking: Reported on 7/26/2023)     No current facility-administered medications for this visit.       REVIEW OF SYSTEMS:   10 point review of systems reviewed and pertinent positives in the HPI.     PHYSICAL EXAMINATION:  Physical Exam     Vital signs: BP (!) 145/64   Pulse 64   Temp 98.1  F (36.7  C)   Resp 16   Ht 1.549 m (5' 1\")   Wt 85.2 kg (187 lb 12.8 oz)   SpO2 97%   BMI 35.48 kg/m    General: Awake, Alert, oriented x3, sitting up in wheelchair, appropriately, follows simple commands, conversant  HEENT:Pink conjunctiva, moist oral mucosa  NECK: Supple  CVS:  S1  S2, without murmur or gallop.   LUNG: Clear to auscultation, No wheezes, rales or rhonci.  BACK: No kyphosis of the thoracic spine  ABDOMEN: Soft, obese, nontender to palpation, with positive bowel sounds  EXTREMITIES: Moves both upper and lower extremities limited to the left lower extremity, 2+ pedal edema, which extremities, no calf tenderness.  Positive dorsi and plantarflexion.  SKIN: Warm and dry, surgical bandage placed to left knee.  NEUROLOGIC: Intact, pulses palpable  PSYCHIATRIC: Flat affect.      Labs:  All labs reviewed in the nursing home record and [employer]   @  Lab Results   Component Value Date    WBC 8.5 07/22/2023     Lab " Results   Component Value Date    RBC 3.88 07/22/2023     Lab Results   Component Value Date    HGB 9.6 07/22/2023     Lab Results   Component Value Date    HCT 31.7 07/22/2023     Lab Results   Component Value Date    MCV 82 07/22/2023     Lab Results   Component Value Date    MCH 24.7 07/22/2023     Lab Results   Component Value Date    MCHC 30.3 07/22/2023     Lab Results   Component Value Date    RDW 17.5 07/22/2023     Lab Results   Component Value Date     07/25/2023        @Last Comprehensive Metabolic Panel:  Sodium   Date Value Ref Range Status   07/22/2023 141 136 - 145 mmol/L Final     Potassium   Date Value Ref Range Status   07/22/2023 3.8 3.4 - 5.3 mmol/L Final     Chloride   Date Value Ref Range Status   07/22/2023 108 (H) 98 - 107 mmol/L Final     Carbon Dioxide (CO2)   Date Value Ref Range Status   07/22/2023 23 22 - 29 mmol/L Final     Anion Gap   Date Value Ref Range Status   07/22/2023 10 7 - 15 mmol/L Final     GLUCOSE BY METER POCT   Date Value Ref Range Status   07/25/2023 101 (H) 70 - 99 mg/dL Final     Urea Nitrogen   Date Value Ref Range Status   07/22/2023 11.5 8.0 - 23.0 mg/dL Final     Creatinine   Date Value Ref Range Status   07/22/2023 0.62 0.51 - 0.95 mg/dL Final     GFR Estimate   Date Value Ref Range Status   07/22/2023 >90 >60 mL/min/1.73m2 Final     Calcium   Date Value Ref Range Status   07/22/2023 10.8 (H) 8.8 - 10.2 mg/dL Final     45 minutes spent preparing for this visit, reviewing hospitalization records, reviewing imaging, reviewing labs, reviewing medications as well as face-to-face time spent with patient and collaborating with nursing staff.     At the conclusion of the encounter I discussed  the results of all of the tests and the disposition with patient.   All questions were answered.  The patient acknowledged understanding and was involved in the decision making regarding the overall care plan.        This note has been dictated using voice recognition  software. Any grammatical or context distortions are unintentional and inherent to the software    Electronically signed by: Aleshia Odom CNP       Sincerely,        Aleshia Odom, NP

## 2023-07-27 LAB
ATRIAL RATE - MUSE: 104 BPM
DIASTOLIC BLOOD PRESSURE - MUSE: NORMAL MMHG
INTERPRETATION ECG - MUSE: NORMAL
P AXIS - MUSE: 71 DEGREES
PR INTERVAL - MUSE: 286 MS
QRS DURATION - MUSE: 72 MS
QT - MUSE: 276 MS
QTC - MUSE: 362 MS
R AXIS - MUSE: 45 DEGREES
SYSTOLIC BLOOD PRESSURE - MUSE: NORMAL MMHG
T AXIS - MUSE: 17 DEGREES
VENTRICULAR RATE- MUSE: 104 BPM

## 2023-08-01 ENCOUNTER — TRANSITIONAL CARE UNIT VISIT (OUTPATIENT)
Dept: GERIATRICS | Facility: CLINIC | Age: 78
End: 2023-08-01
Payer: COMMERCIAL

## 2023-08-01 VITALS
WEIGHT: 189.6 LBS | HEIGHT: 60 IN | BODY MASS INDEX: 37.22 KG/M2 | TEMPERATURE: 97 F | SYSTOLIC BLOOD PRESSURE: 114 MMHG | HEART RATE: 75 BPM | RESPIRATION RATE: 16 BRPM | DIASTOLIC BLOOD PRESSURE: 57 MMHG | OXYGEN SATURATION: 98 %

## 2023-08-01 DIAGNOSIS — Z96.652 HISTORY OF TOTAL KNEE ARTHROPLASTY, LEFT: Primary | ICD-10-CM

## 2023-08-01 DIAGNOSIS — K52.831 COLLAGENOUS COLITIS: ICD-10-CM

## 2023-08-01 DIAGNOSIS — Z96.652 STATUS POST TOTAL LEFT KNEE REPLACEMENT: ICD-10-CM

## 2023-08-01 DIAGNOSIS — E11.40 TYPE 2 DIABETES MELLITUS WITH DIABETIC NEUROPATHY, WITHOUT LONG-TERM CURRENT USE OF INSULIN (H): ICD-10-CM

## 2023-08-01 DIAGNOSIS — M17.0 OSTEOARTHRITIS OF BOTH KNEES, UNSPECIFIED OSTEOARTHRITIS TYPE: ICD-10-CM

## 2023-08-01 DIAGNOSIS — G62.9 NEUROPATHY: ICD-10-CM

## 2023-08-01 PROCEDURE — 99309 SBSQ NF CARE MODERATE MDM 30: CPT | Performed by: NURSE PRACTITIONER

## 2023-08-01 RX ORDER — OXYCODONE HYDROCHLORIDE 5 MG/1
5 TABLET ORAL EVERY 4 HOURS PRN
Qty: 60 TABLET | Refills: 0 | Status: SHIPPED | OUTPATIENT
Start: 2023-08-01

## 2023-08-01 NOTE — LETTER
8/1/2023        RE: Lorie Garrido  [address]  [facility] [address]        [facility]    Code Status:  FULL CODE   Visit Type:   Chief Complaint   Patient presents with     TCU Follow Up     Facility:  [facility] ([facility]) [[id number]]         HPI: Lorie Garrido is a 77 year old female who I am seeing today for follow-up on the TCU.  Patient recently hospitalized on 7/22/2023 secondary to DJD of the knees status post left TKA.  Patient presented with general weakness with failure to thrive at home.  Generalized weakness multifactorial secondary to chronic knee pain with DJD, poor appetite and oral intake.  Patient with history of TKA of the left.  Postoperative pain control oxycodone.  Hypomagnesia replaced.  Diabetes mellitus type 2 on metformin.  Hypertension satisfactory.  IBS with chronic diarrhea.  Chronic leg edema on Lasix.  Peripheral neuropathy on gabapentin.    Transitional Care Course: Today patient sitting up in bedside chair. Patient status post left total knee arthroplasty.  Patient continues with surgical bandage in place.  Pain controlled with Tylenol and oxycodone.  Underlying IBS with chronic diarrhea.  Appetite improving.  She is emptying her bladder.  She denies any shortness of breath or chest pain.  She does have chronic lower extremity edema.      Assessment/Plan:     History of total knee arthroplasty, left  Osteoarthritis of both knees, unspecified osteoarthritis type  -Leave surgical bandage in place until follow-up with Ortho.  -Continue Tylenol and oxycodone for pain.  -Postoperative hemoglobin 9.6.    Hypomagnesemia  -Magnesium slightly low at 1.5.  She is now off her supplement.  -Resume magnesium 400 mg twice daily.  -Follow-up magnesium level Thursday.     Type 2 diabetes mellitus with diabetic neuropathy, without long-term current use of insulin (H)  -Metformin 500 mg 2 times daily.  -Blood sugar checks twice daily.  -Consistent carb diet.    Collagenous  colitis  -Chronic loose stools.  -Bentyl 20 mg 3 times daily as needed.    Neuropathy  -Gabapentin 600 mg twice daily.        Active Ambulatory Problems     Diagnosis Date Noted     Weakness 07/22/2023     Collagenous colitis 11/18/2022     Hyperlipidemia 11/04/2006     Hyperparathyroidism (H) 02/12/2020     Hypertension 11/23/2004     Morbid obesity (H) 10/03/2012     Type 2 diabetes mellitus with diabetic neuropathy, without long-term current use of insulin (H) 02/12/2020     Allergic rhinitis 11/23/2004     Carpal tunnel syndrome 09/16/2004     Vitamin D deficiency 05/02/2011     Sciatica 11/23/2004     Restless legs syndrome 06/12/2008     Prediabetes 07/25/2023     Neuropathy 07/25/2023     Iron deficiency 06/12/2008     Gout 11/23/2004     Edema 11/23/2004     Coronary atherosclerosis 03/30/2005     Resolved Ambulatory Problems     Diagnosis Date Noted     No Resolved Ambulatory Problems     No Additional Past Medical History     Allergies   Allergen Reactions     Naproxen Nausea     Penicillins Hives     Triamterene Rash       All Meds and Allergies reviewed in the record at the facility and is the most up-to-date.    Current Outpatient Medications   Medication Sig     acetaminophen (TYLENOL) 325 MG tablet Take 650 mg by mouth 3 times daily as needed for mild pain     alendronate (FOSAMAX) 70 MG tablet Take 70 mg by mouth every 7 days     allopurinol (ZYLOPRIM) 300 MG tablet Take 300 mg by mouth daily     atorvastatin (LIPITOR) 40 MG tablet Take 40 mg by mouth daily     dicyclomine (BENTYL) 20 MG tablet Take 20 mg by mouth 3 times daily as needed     furosemide (LASIX) 40 MG tablet Take 40 mg by mouth daily     gabapentin (NEURONTIN) 300 MG capsule Take 600 mg by mouth 2 times daily     lisinopril (ZESTRIL) 10 MG tablet Take 10 mg by mouth daily     metFORMIN (GLUCOPHAGE) 500 MG tablet Take 500 mg by mouth 2 times daily (with meals)     omega-3 acid ethyl esters (LOVAZA) 1 g capsule Take 1 g by mouth daily      oxyCODONE (ROXICODONE) 5 MG tablet Take 1 tablet (5 mg) by mouth every 4 hours as needed for severe pain     potassium chloride ER (KLOR-CON M) 10 MEQ CR tablet Take 10 mEq by mouth 2 times daily     primidone (MYSOLINE) 50 MG tablet Take 100 mg by mouth 3 times daily     tiZANidine (ZANAFLEX) 4 MG tablet Take 4 mg by mouth 2 times daily     No current facility-administered medications for this visit.       REVIEW OF SYSTEMS:   10 point review of systems reviewed and pertinent positives in the HPI.     PHYSICAL EXAMINATION:  Physical Exam     Vital signs: /57   Pulse 75   Temp 97  F (36.1  C)   Resp 16   Ht 1.524 m (5')   Wt 86 kg (189 lb 9.6 oz)   SpO2 98%   BMI 37.03 kg/m    General: Awake, Alert, oriented x3, sitting up in bedside chair, conversant  HEENT:Pink conjunctiva, moist oral mucosa  NECK: Supple  CVS:  S1  S2, without murmur or gallop.   LUNG: Clear to auscultation, No wheezes, rales or rhonci.  BACK: No kyphosis of the thoracic spine  ABDOMEN: Soft, obese, nontender to palpation, with positive bowel sounds  EXTREMITIES: Moves both upper and lower extremities limited to the left lower extremity, 2+ pedal edema, which extremities, no calf tenderness.  Positive dorsi and plantarflexion.  SKIN: Warm and dry, surgical bandage in place to left knee.  NEUROLOGIC: Intact, pulses palpable  PSYCHIATRIC: Flat affect.  Cognitive impairment noted.      Labs:  All labs reviewed in the nursing home record and [employer]   @  Lab Results   Component Value Date    WBC 8.5 07/22/2023     Lab Results   Component Value Date    RBC 3.88 07/22/2023     Lab Results   Component Value Date    HGB 9.6 07/22/2023     Lab Results   Component Value Date    HCT 31.7 07/22/2023     Lab Results   Component Value Date    MCV 82 07/22/2023     Lab Results   Component Value Date    MCH 24.7 07/22/2023     Lab Results   Component Value Date    MCHC 30.3 07/22/2023     Lab Results   Component Value Date    RDW 17.5 07/22/2023      Lab Results   Component Value Date     07/25/2023        @Last Comprehensive Metabolic Panel:  Sodium   Date Value Ref Range Status   07/22/2023 141 136 - 145 mmol/L Final     Potassium   Date Value Ref Range Status   07/22/2023 3.8 3.4 - 5.3 mmol/L Final     Chloride   Date Value Ref Range Status   07/22/2023 108 (H) 98 - 107 mmol/L Final     Carbon Dioxide (CO2)   Date Value Ref Range Status   07/22/2023 23 22 - 29 mmol/L Final     Anion Gap   Date Value Ref Range Status   07/22/2023 10 7 - 15 mmol/L Final     GLUCOSE BY METER POCT   Date Value Ref Range Status   07/25/2023 101 (H) 70 - 99 mg/dL Final     Urea Nitrogen   Date Value Ref Range Status   07/22/2023 11.5 8.0 - 23.0 mg/dL Final     Creatinine   Date Value Ref Range Status   07/22/2023 0.62 0.51 - 0.95 mg/dL Final     GFR Estimate   Date Value Ref Range Status   07/22/2023 >90 >60 mL/min/1.73m2 Final     Calcium   Date Value Ref Range Status   07/22/2023 10.8 (H) 8.8 - 10.2 mg/dL Final       This note has been dictated using voice recognition software. Any grammatical or context distortions are unintentional and inherent to the software    Electronically signed by: Aleshia Odom CNP       Sincerely,        Aleshia Odom, NP

## 2023-08-02 ENCOUNTER — LAB REQUISITION (OUTPATIENT)
Dept: LAB | Facility: CLINIC | Age: 78
End: 2023-08-02
Payer: COMMERCIAL

## 2023-08-02 DIAGNOSIS — E83.42 HYPOMAGNESEMIA: ICD-10-CM

## 2023-08-02 DIAGNOSIS — D64.9 ANEMIA, UNSPECIFIED: ICD-10-CM

## 2023-08-02 NOTE — PROGRESS NOTES
[facility] GERIATRIC SERVICES    Code Status:  FULL CODE   Visit Type:   Chief Complaint   Patient presents with    TCU Follow Up     Facility:  [facility] ([facility]) [[id number]]         HPI: Lorie Garrido is a 77 year old female who I am seeing today for follow-up on the TCU.  Patient recently hospitalized on 7/22/2023 secondary to DJD of the knees status post left TKA.  Patient presented with general weakness with failure to thrive at home.  Generalized weakness multifactorial secondary to chronic knee pain with DJD, poor appetite and oral intake.  Patient with history of TKA of the left.  Postoperative pain control oxycodone.  Hypomagnesia replaced.  Diabetes mellitus type 2 on metformin.  Hypertension satisfactory.  IBS with chronic diarrhea.  Chronic leg edema on Lasix.  Peripheral neuropathy on gabapentin.    Transitional Care Course: Today patient sitting up in bedside chair. Patient status post left total knee arthroplasty.  Patient continues with surgical bandage in place.  Pain controlled with Tylenol and oxycodone.  Underlying IBS with chronic diarrhea.  Appetite improving.  She is emptying her bladder.  She denies any shortness of breath or chest pain.  She does have chronic lower extremity edema.      Assessment/Plan:     History of total knee arthroplasty, left  Osteoarthritis of both knees, unspecified osteoarthritis type  -Leave surgical bandage in place until follow-up with Ortho.  -Continue Tylenol and oxycodone for pain.  -Postoperative hemoglobin 9.6.    Hypomagnesemia  -Magnesium slightly low at 1.5.  She is now off her supplement.  -Resume magnesium 400 mg twice daily.  -Follow-up magnesium level Thursday.     Type 2 diabetes mellitus with diabetic neuropathy, without long-term current use of insulin (H)  -Metformin 500 mg 2 times daily.  -Blood sugar checks twice daily.  -Consistent carb diet.    Collagenous colitis  -Chronic loose stools.  -Bentyl 20 mg 3 times daily as  needed.    Neuropathy  -Gabapentin 600 mg twice daily.        Active Ambulatory Problems     Diagnosis Date Noted    Weakness 07/22/2023    Collagenous colitis 11/18/2022    Hyperlipidemia 11/04/2006    Hyperparathyroidism (H) 02/12/2020    Hypertension 11/23/2004    Morbid obesity (H) 10/03/2012    Type 2 diabetes mellitus with diabetic neuropathy, without long-term current use of insulin (H) 02/12/2020    Allergic rhinitis 11/23/2004    Carpal tunnel syndrome 09/16/2004    Vitamin D deficiency 05/02/2011    Sciatica 11/23/2004    Restless legs syndrome 06/12/2008    Prediabetes 07/25/2023    Neuropathy 07/25/2023    Iron deficiency 06/12/2008    Gout 11/23/2004    Edema 11/23/2004    Coronary atherosclerosis 03/30/2005     Resolved Ambulatory Problems     Diagnosis Date Noted    No Resolved Ambulatory Problems     No Additional Past Medical History     Allergies   Allergen Reactions    Naproxen Nausea    Penicillins Hives    Triamterene Rash       All Meds and Allergies reviewed in the record at the facility and is the most up-to-date.    Current Outpatient Medications   Medication Sig    acetaminophen (TYLENOL) 325 MG tablet Take 650 mg by mouth 3 times daily as needed for mild pain    alendronate (FOSAMAX) 70 MG tablet Take 70 mg by mouth every 7 days    allopurinol (ZYLOPRIM) 300 MG tablet Take 300 mg by mouth daily    atorvastatin (LIPITOR) 40 MG tablet Take 40 mg by mouth daily    dicyclomine (BENTYL) 20 MG tablet Take 20 mg by mouth 3 times daily as needed    furosemide (LASIX) 40 MG tablet Take 40 mg by mouth daily    gabapentin (NEURONTIN) 300 MG capsule Take 600 mg by mouth 2 times daily    lisinopril (ZESTRIL) 10 MG tablet Take 10 mg by mouth daily    metFORMIN (GLUCOPHAGE) 500 MG tablet Take 500 mg by mouth 2 times daily (with meals)    omega-3 acid ethyl esters (LOVAZA) 1 g capsule Take 1 g by mouth daily    oxyCODONE (ROXICODONE) 5 MG tablet Take 1 tablet (5 mg) by mouth every 4 hours as needed for  severe pain    potassium chloride ER (KLOR-CON M) 10 MEQ CR tablet Take 10 mEq by mouth 2 times daily    primidone (MYSOLINE) 50 MG tablet Take 100 mg by mouth 3 times daily    tiZANidine (ZANAFLEX) 4 MG tablet Take 4 mg by mouth 2 times daily     No current facility-administered medications for this visit.       REVIEW OF SYSTEMS:   10 point review of systems reviewed and pertinent positives in the HPI.     PHYSICAL EXAMINATION:  Physical Exam     Vital signs: /57   Pulse 75   Temp 97  F (36.1  C)   Resp 16   Ht 1.524 m (5')   Wt 86 kg (189 lb 9.6 oz)   SpO2 98%   BMI 37.03 kg/m    General: Awake, Alert, oriented x3, sitting up in bedside chair, conversant  HEENT:Pink conjunctiva, moist oral mucosa  NECK: Supple  CVS:  S1  S2, without murmur or gallop.   LUNG: Clear to auscultation, No wheezes, rales or rhonci.  BACK: No kyphosis of the thoracic spine  ABDOMEN: Soft, obese, nontender to palpation, with positive bowel sounds  EXTREMITIES: Moves both upper and lower extremities limited to the left lower extremity, 2+ pedal edema, which extremities, no calf tenderness.  Positive dorsi and plantarflexion.  SKIN: Warm and dry, surgical bandage in place to left knee.  NEUROLOGIC: Intact, pulses palpable  PSYCHIATRIC: Flat affect.  Cognitive impairment noted.      Labs:  All labs reviewed in the nursing home record and Epic   @  Lab Results   Component Value Date    WBC 8.5 07/22/2023     Lab Results   Component Value Date    RBC 3.88 07/22/2023     Lab Results   Component Value Date    HGB 9.6 07/22/2023     Lab Results   Component Value Date    HCT 31.7 07/22/2023     Lab Results   Component Value Date    MCV 82 07/22/2023     Lab Results   Component Value Date    MCH 24.7 07/22/2023     Lab Results   Component Value Date    MCHC 30.3 07/22/2023     Lab Results   Component Value Date    RDW 17.5 07/22/2023     Lab Results   Component Value Date     07/25/2023        @Last Comprehensive Metabolic  Panel:  Sodium   Date Value Ref Range Status   07/22/2023 141 136 - 145 mmol/L Final     Potassium   Date Value Ref Range Status   07/22/2023 3.8 3.4 - 5.3 mmol/L Final     Chloride   Date Value Ref Range Status   07/22/2023 108 (H) 98 - 107 mmol/L Final     Carbon Dioxide (CO2)   Date Value Ref Range Status   07/22/2023 23 22 - 29 mmol/L Final     Anion Gap   Date Value Ref Range Status   07/22/2023 10 7 - 15 mmol/L Final     GLUCOSE BY METER POCT   Date Value Ref Range Status   07/25/2023 101 (H) 70 - 99 mg/dL Final     Urea Nitrogen   Date Value Ref Range Status   07/22/2023 11.5 8.0 - 23.0 mg/dL Final     Creatinine   Date Value Ref Range Status   07/22/2023 0.62 0.51 - 0.95 mg/dL Final     GFR Estimate   Date Value Ref Range Status   07/22/2023 >90 >60 mL/min/1.73m2 Final     Calcium   Date Value Ref Range Status   07/22/2023 10.8 (H) 8.8 - 10.2 mg/dL Final       This note has been dictated using voice recognition software. Any grammatical or context distortions are unintentional and inherent to the software    Electronically signed by: Aleshia Odom, CNP

## 2023-08-03 ENCOUNTER — TRANSITIONAL CARE UNIT VISIT (OUTPATIENT)
Dept: GERIATRICS | Facility: CLINIC | Age: 78
End: 2023-08-03
Payer: COMMERCIAL

## 2023-08-03 VITALS
TEMPERATURE: 98 F | HEART RATE: 83 BPM | SYSTOLIC BLOOD PRESSURE: 139 MMHG | WEIGHT: 189.6 LBS | RESPIRATION RATE: 17 BRPM | HEIGHT: 60 IN | DIASTOLIC BLOOD PRESSURE: 61 MMHG | BODY MASS INDEX: 37.22 KG/M2 | OXYGEN SATURATION: 100 %

## 2023-08-03 DIAGNOSIS — E11.40 TYPE 2 DIABETES MELLITUS WITH DIABETIC NEUROPATHY, WITHOUT LONG-TERM CURRENT USE OF INSULIN (H): ICD-10-CM

## 2023-08-03 DIAGNOSIS — G62.9 NEUROPATHY: ICD-10-CM

## 2023-08-03 DIAGNOSIS — Z96.652 HISTORY OF TOTAL KNEE ARTHROPLASTY, LEFT: ICD-10-CM

## 2023-08-03 DIAGNOSIS — E83.42 HYPOMAGNESEMIA: ICD-10-CM

## 2023-08-03 DIAGNOSIS — Z96.652 STATUS POST TOTAL LEFT KNEE REPLACEMENT: Primary | ICD-10-CM

## 2023-08-03 DIAGNOSIS — M17.0 OSTEOARTHRITIS OF BOTH KNEES, UNSPECIFIED OSTEOARTHRITIS TYPE: ICD-10-CM

## 2023-08-03 LAB
HGB BLD-MCNC: 9.6 G/DL (ref 11.7–15.7)
MAGNESIUM SERPL-MCNC: 2 MG/DL (ref 1.7–2.3)

## 2023-08-03 PROCEDURE — 99309 SBSQ NF CARE MODERATE MDM 30: CPT | Performed by: NURSE PRACTITIONER

## 2023-08-03 PROCEDURE — 36415 COLL VENOUS BLD VENIPUNCTURE: CPT | Performed by: FAMILY MEDICINE

## 2023-08-03 PROCEDURE — 85018 HEMOGLOBIN: CPT | Performed by: FAMILY MEDICINE

## 2023-08-03 PROCEDURE — P9604 ONE-WAY ALLOW PRORATED TRIP: HCPCS | Performed by: FAMILY MEDICINE

## 2023-08-03 PROCEDURE — 83735 ASSAY OF MAGNESIUM: CPT | Performed by: FAMILY MEDICINE

## 2023-08-03 NOTE — LETTER
8/3/2023        RE: Lorie Garrido  [address]  [facility] [address]        [facility] GERIATRIC SERVICES    Code Status:  FULL CODE   Visit Type:   Chief Complaint   Patient presents with     TCU Follow Up     Facility:  [facility] ([facility]) [[id number]]         HPI: Lorie Garrido is a 77 year old female who I am seeing today for follow-up on the TCU.  Patient recently hospitalized on 7/22/2023 secondary to DJD of the knees status post left TKA.  Patient presented with general weakness with failure to thrive at home.  Generalized weakness multifactorial secondary to chronic knee pain with DJD, poor appetite and oral intake.  Patient with history of TKA of the left.  Postoperative pain control oxycodone.  Hypomagnesia replaced.  Diabetes mellitus type 2 on metformin.  Hypertension satisfactory.  IBS with chronic diarrhea.  Chronic leg edema on Lasix.  Peripheral neuropathy on gabapentin.    Transitional Care Course: Today patient sitting up in wheelchair. Patient status post left total knee arthroplasty.  Patient continues with surgical bandage in place. 2-3+ edema. Large extremities. Pt continues on lasix. No SOB or CP.  Pain controlled with Tylenol and oxycodone.     Assessment/Plan:     History of total knee arthroplasty, left  Osteoarthritis of both knees, unspecified osteoarthritis type  -Leave surgical bandage in place until follow-up with Ortho.  -Continue Tylenol and oxycodone for pain.  -Postoperative hemoglobin 9.6.  -Follow up with Ortho in 1 week.   -tubi  on in am/off at HS.   -Encourage elevation.     Hypomagnesemia  -Magnesium slightly low at 1.5.    -magnesium 400 mg twice daily.  -Follow-up magnesium today 2.0.     Type 2 diabetes mellitus with diabetic neuropathy, without long-term current use of insulin (H)  -Metformin 500 mg 2 times daily.  -Blood sugar checks twice daily.  -Consistent carb diet.    Collagenous colitis  -Chronic loose stools.  -Bentyl 20 mg 3 times daily as  needed.    Neuropathy  -Gabapentin 600 mg twice daily.        Active Ambulatory Problems     Diagnosis Date Noted     Weakness 07/22/2023     Collagenous colitis 11/18/2022     Hyperlipidemia 11/04/2006     Hyperparathyroidism (H) 02/12/2020     Hypertension 11/23/2004     Morbid obesity (H) 10/03/2012     Type 2 diabetes mellitus with diabetic neuropathy, without long-term current use of insulin (H) 02/12/2020     Allergic rhinitis 11/23/2004     Carpal tunnel syndrome 09/16/2004     Vitamin D deficiency 05/02/2011     Sciatica 11/23/2004     Restless legs syndrome 06/12/2008     Prediabetes 07/25/2023     Neuropathy 07/25/2023     Iron deficiency 06/12/2008     Gout 11/23/2004     Edema 11/23/2004     Coronary atherosclerosis 03/30/2005     Resolved Ambulatory Problems     Diagnosis Date Noted     No Resolved Ambulatory Problems     No Additional Past Medical History     Allergies   Allergen Reactions     Naproxen Nausea     Penicillins Hives     Triamterene Rash       All Meds and Allergies reviewed in the record at the facility and is the most up-to-date.    Current Outpatient Medications   Medication Sig     acetaminophen (TYLENOL) 325 MG tablet Take 650 mg by mouth 3 times daily as needed for mild pain     alendronate (FOSAMAX) 70 MG tablet Take 70 mg by mouth every 7 days     allopurinol (ZYLOPRIM) 300 MG tablet Take 300 mg by mouth daily     atorvastatin (LIPITOR) 40 MG tablet Take 40 mg by mouth daily     dicyclomine (BENTYL) 20 MG tablet Take 20 mg by mouth 3 times daily as needed     furosemide (LASIX) 40 MG tablet Take 40 mg by mouth daily     gabapentin (NEURONTIN) 300 MG capsule Take 600 mg by mouth 2 times daily     lisinopril (ZESTRIL) 10 MG tablet Take 10 mg by mouth daily     metFORMIN (GLUCOPHAGE) 500 MG tablet Take 500 mg by mouth 2 times daily (with meals)     omega-3 acid ethyl esters (LOVAZA) 1 g capsule Take 1 g by mouth daily     oxyCODONE (ROXICODONE) 5 MG tablet Take 1 tablet (5 mg) by  mouth every 4 hours as needed for severe pain     potassium chloride ER (KLOR-CON M) 10 MEQ CR tablet Take 10 mEq by mouth 2 times daily     primidone (MYSOLINE) 50 MG tablet Take 100 mg by mouth 3 times daily     tiZANidine (ZANAFLEX) 4 MG tablet Take 4 mg by mouth 2 times daily     No current facility-administered medications for this visit.       REVIEW OF SYSTEMS:   10 point review of systems reviewed and pertinent positives in the HPI.     PHYSICAL EXAMINATION:  Physical Exam     Vital signs: /61   Pulse 83   Temp 98  F (36.7  C)   Resp 17   Ht 1.524 m (5')   Wt 86 kg (189 lb 9.6 oz)   SpO2 100%   BMI 37.03 kg/m    General: Awake, Alert, oriented x3, sitting up in bedside chair, conversant  HEENT:Pink conjunctiva, moist oral mucosa  NECK: Supple  BACK: No kyphosis of the thoracic spine  ABDOMEN: Soft, obese, nontender to palpation, with positive bowel sounds  EXTREMITIES: Moves both upper and lower extremities limited to the left lower extremity, 2+ pedal edema, which extremities, no calf tenderness.  Positive dorsi and plantarflexion.  SKIN: Warm and dry, surgical bandage in place to left knee.  NEUROLOGIC: Intact, pulses palpable  PSYCHIATRIC: Flat affect.  Cognitive impairment noted.      Labs:  All labs reviewed in the nursing home record and Epic   @  Lab Results   Component Value Date    WBC 8.5 07/22/2023     Lab Results   Component Value Date    RBC 3.88 07/22/2023     Lab Results   Component Value Date    HGB 9.6 07/22/2023     Lab Results   Component Value Date    HCT 31.7 07/22/2023     Lab Results   Component Value Date    MCV 82 07/22/2023     Lab Results   Component Value Date    MCH 24.7 07/22/2023     Lab Results   Component Value Date    MCHC 30.3 07/22/2023     Lab Results   Component Value Date    RDW 17.5 07/22/2023     Lab Results   Component Value Date     07/25/2023        @Last Comprehensive Metabolic Panel:  Sodium   Date Value Ref Range Status   07/22/2023 141 136 -  145 mmol/L Final     Potassium   Date Value Ref Range Status   07/22/2023 3.8 3.4 - 5.3 mmol/L Final     Chloride   Date Value Ref Range Status   07/22/2023 108 (H) 98 - 107 mmol/L Final     Carbon Dioxide (CO2)   Date Value Ref Range Status   07/22/2023 23 22 - 29 mmol/L Final     Anion Gap   Date Value Ref Range Status   07/22/2023 10 7 - 15 mmol/L Final     GLUCOSE BY METER POCT   Date Value Ref Range Status   07/25/2023 101 (H) 70 - 99 mg/dL Final     Urea Nitrogen   Date Value Ref Range Status   07/22/2023 11.5 8.0 - 23.0 mg/dL Final     Creatinine   Date Value Ref Range Status   07/22/2023 0.62 0.51 - 0.95 mg/dL Final     GFR Estimate   Date Value Ref Range Status   07/22/2023 >90 >60 mL/min/1.73m2 Final     Calcium   Date Value Ref Range Status   07/22/2023 10.8 (H) 8.8 - 10.2 mg/dL Final       This note has been dictated using voice recognition software. Any grammatical or context distortions are unintentional and inherent to the software    Electronically signed by: Aleshia Odom, PALLAVI       Sincerely,        Aleshia Odom, NP       Statement Selected

## 2023-08-04 NOTE — PROGRESS NOTES
[facility] GERIATRIC SERVICES    Code Status:  FULL CODE   Visit Type:   Chief Complaint   Patient presents with    TCU Follow Up     Facility:  [facility] ([facility]) [[id number]]         HPI: Lorie Garrido is a 77 year old female who I am seeing today for follow-up on the TCU.  Patient recently hospitalized on 7/22/2023 secondary to DJD of the knees status post left TKA.  Patient presented with general weakness with failure to thrive at home.  Generalized weakness multifactorial secondary to chronic knee pain with DJD, poor appetite and oral intake.  Patient with history of TKA of the left.  Postoperative pain control oxycodone.  Hypomagnesia replaced.  Diabetes mellitus type 2 on metformin.  Hypertension satisfactory.  IBS with chronic diarrhea.  Chronic leg edema on Lasix.  Peripheral neuropathy on gabapentin.    Transitional Care Course: Today patient sitting up in wheelchair. Patient status post left total knee arthroplasty.  Patient continues with surgical bandage in place. 2-3+ edema. Large extremities. Pt continues on lasix. No SOB or CP.  Pain controlled with Tylenol and oxycodone.     Assessment/Plan:     History of total knee arthroplasty, left  Osteoarthritis of both knees, unspecified osteoarthritis type  -Leave surgical bandage in place until follow-up with Ortho.  -Continue Tylenol and oxycodone for pain.  -Postoperative hemoglobin 9.6.  -Follow up with Ortho in 1 week.   -tubi  on in am/off at HS.   -Encourage elevation.     Hypomagnesemia  -Magnesium slightly low at 1.5.    -magnesium 400 mg twice daily.  -Follow-up magnesium today 2.0.     Type 2 diabetes mellitus with diabetic neuropathy, without long-term current use of insulin (H)  -Metformin 500 mg 2 times daily.  -Blood sugar checks twice daily.  -Consistent carb diet.    Collagenous colitis  -Chronic loose stools.  -Bentyl 20 mg 3 times daily as needed.    Neuropathy  -Gabapentin 600 mg twice daily.        Active Ambulatory Problems      Diagnosis Date Noted    Weakness 07/22/2023    Collagenous colitis 11/18/2022    Hyperlipidemia 11/04/2006    Hyperparathyroidism (H) 02/12/2020    Hypertension 11/23/2004    Morbid obesity (H) 10/03/2012    Type 2 diabetes mellitus with diabetic neuropathy, without long-term current use of insulin (H) 02/12/2020    Allergic rhinitis 11/23/2004    Carpal tunnel syndrome 09/16/2004    Vitamin D deficiency 05/02/2011    Sciatica 11/23/2004    Restless legs syndrome 06/12/2008    Prediabetes 07/25/2023    Neuropathy 07/25/2023    Iron deficiency 06/12/2008    Gout 11/23/2004    Edema 11/23/2004    Coronary atherosclerosis 03/30/2005     Resolved Ambulatory Problems     Diagnosis Date Noted    No Resolved Ambulatory Problems     No Additional Past Medical History     Allergies   Allergen Reactions    Naproxen Nausea    Penicillins Hives    Triamterene Rash       All Meds and Allergies reviewed in the record at the facility and is the most up-to-date.    Current Outpatient Medications   Medication Sig    acetaminophen (TYLENOL) 325 MG tablet Take 650 mg by mouth 3 times daily as needed for mild pain    alendronate (FOSAMAX) 70 MG tablet Take 70 mg by mouth every 7 days    allopurinol (ZYLOPRIM) 300 MG tablet Take 300 mg by mouth daily    atorvastatin (LIPITOR) 40 MG tablet Take 40 mg by mouth daily    dicyclomine (BENTYL) 20 MG tablet Take 20 mg by mouth 3 times daily as needed    furosemide (LASIX) 40 MG tablet Take 40 mg by mouth daily    gabapentin (NEURONTIN) 300 MG capsule Take 600 mg by mouth 2 times daily    lisinopril (ZESTRIL) 10 MG tablet Take 10 mg by mouth daily    metFORMIN (GLUCOPHAGE) 500 MG tablet Take 500 mg by mouth 2 times daily (with meals)    omega-3 acid ethyl esters (LOVAZA) 1 g capsule Take 1 g by mouth daily    oxyCODONE (ROXICODONE) 5 MG tablet Take 1 tablet (5 mg) by mouth every 4 hours as needed for severe pain    potassium chloride ER (KLOR-CON M) 10 MEQ CR tablet Take 10 mEq by mouth 2  times daily    primidone (MYSOLINE) 50 MG tablet Take 100 mg by mouth 3 times daily    tiZANidine (ZANAFLEX) 4 MG tablet Take 4 mg by mouth 2 times daily     No current facility-administered medications for this visit.       REVIEW OF SYSTEMS:   10 point review of systems reviewed and pertinent positives in the HPI.     PHYSICAL EXAMINATION:  Physical Exam     Vital signs: /61   Pulse 83   Temp 98  F (36.7  C)   Resp 17   Ht 1.524 m (5')   Wt 86 kg (189 lb 9.6 oz)   SpO2 100%   BMI 37.03 kg/m    General: Awake, Alert, oriented x3, sitting up in bedside chair, conversant  HEENT:Pink conjunctiva, moist oral mucosa  NECK: Supple  BACK: No kyphosis of the thoracic spine  ABDOMEN: Soft, obese, nontender to palpation, with positive bowel sounds  EXTREMITIES: Moves both upper and lower extremities limited to the left lower extremity, 2+ pedal edema, which extremities, no calf tenderness.  Positive dorsi and plantarflexion.  SKIN: Warm and dry, surgical bandage in place to left knee.  NEUROLOGIC: Intact, pulses palpable  PSYCHIATRIC: Flat affect.  Cognitive impairment noted.      Labs:  All labs reviewed in the nursing home record and Epic   @  Lab Results   Component Value Date    WBC 8.5 07/22/2023     Lab Results   Component Value Date    RBC 3.88 07/22/2023     Lab Results   Component Value Date    HGB 9.6 07/22/2023     Lab Results   Component Value Date    HCT 31.7 07/22/2023     Lab Results   Component Value Date    MCV 82 07/22/2023     Lab Results   Component Value Date    MCH 24.7 07/22/2023     Lab Results   Component Value Date    MCHC 30.3 07/22/2023     Lab Results   Component Value Date    RDW 17.5 07/22/2023     Lab Results   Component Value Date     07/25/2023        @Last Comprehensive Metabolic Panel:  Sodium   Date Value Ref Range Status   07/22/2023 141 136 - 145 mmol/L Final     Potassium   Date Value Ref Range Status   07/22/2023 3.8 3.4 - 5.3 mmol/L Final     Chloride   Date Value  Ref Range Status   07/22/2023 108 (H) 98 - 107 mmol/L Final     Carbon Dioxide (CO2)   Date Value Ref Range Status   07/22/2023 23 22 - 29 mmol/L Final     Anion Gap   Date Value Ref Range Status   07/22/2023 10 7 - 15 mmol/L Final     GLUCOSE BY METER POCT   Date Value Ref Range Status   07/25/2023 101 (H) 70 - 99 mg/dL Final     Urea Nitrogen   Date Value Ref Range Status   07/22/2023 11.5 8.0 - 23.0 mg/dL Final     Creatinine   Date Value Ref Range Status   07/22/2023 0.62 0.51 - 0.95 mg/dL Final     GFR Estimate   Date Value Ref Range Status   07/22/2023 >90 >60 mL/min/1.73m2 Final     Calcium   Date Value Ref Range Status   07/22/2023 10.8 (H) 8.8 - 10.2 mg/dL Final       This note has been dictated using voice recognition software. Any grammatical or context distortions are unintentional and inherent to the software    Electronically signed by: Aleshia Odom, CNP

## 2023-08-07 ENCOUNTER — DISCHARGE SUMMARY NURSING HOME (OUTPATIENT)
Dept: GERIATRICS | Facility: CLINIC | Age: 78
End: 2023-08-07
Payer: COMMERCIAL

## 2023-08-07 VITALS
SYSTOLIC BLOOD PRESSURE: 127 MMHG | TEMPERATURE: 97.4 F | HEIGHT: 60 IN | DIASTOLIC BLOOD PRESSURE: 79 MMHG | BODY MASS INDEX: 37.03 KG/M2 | HEART RATE: 93 BPM | RESPIRATION RATE: 16 BRPM | WEIGHT: 188.6 LBS | OXYGEN SATURATION: 93 %

## 2023-08-07 DIAGNOSIS — Z96.652 HISTORY OF TOTAL KNEE ARTHROPLASTY, LEFT: ICD-10-CM

## 2023-08-07 DIAGNOSIS — Z96.652 STATUS POST TOTAL LEFT KNEE REPLACEMENT: Primary | ICD-10-CM

## 2023-08-07 DIAGNOSIS — E11.40 TYPE 2 DIABETES MELLITUS WITH DIABETIC NEUROPATHY, WITHOUT LONG-TERM CURRENT USE OF INSULIN (H): ICD-10-CM

## 2023-08-07 DIAGNOSIS — M17.0 OSTEOARTHRITIS OF BOTH KNEES, UNSPECIFIED OSTEOARTHRITIS TYPE: ICD-10-CM

## 2023-08-07 DIAGNOSIS — G62.9 NEUROPATHY: ICD-10-CM

## 2023-08-07 PROCEDURE — 99316 NF DSCHRG MGMT 30 MIN+: CPT | Performed by: NURSE PRACTITIONER

## 2023-08-07 NOTE — LETTER
8/7/2023        RE: Lorie Garrido  [address]  [facility] [address]        [facility]    Code Status:  FULL CODE   Visit Type:   Chief Complaint   Patient presents with     TCU Discharge     Facility:  [facility] ([facility]) [[id number]]         HPI: Lorie Garrido is a 77 year old female who I am seeing today for discharge from the TCU.  Patient recently hospitalized on 7/22/2023 secondary to DJD of the knees status post left TKA.  Patient presented with general weakness with failure to thrive at home.  Generalized weakness multifactorial secondary to chronic knee pain with DJD, poor appetite and oral intake.  Patient with history of TKA of the left.  Postoperative pain control oxycodone.  Hypomagnesia replaced.  Diabetes mellitus type 2 on metformin.  Hypertension satisfactory.  IBS with chronic diarrhea.  Chronic leg edema on Lasix.  Peripheral neuropathy on gabapentin.    Transitional Care Course: Today patient sitting up in wheelchair. Patient status post left total knee arthroplasty.  Pain control with Tylenol codeine.  Patient continues with surgical bandage in place. 2-3+ edema. Large extremities. Pt continues on lasix. No SOB or CP.      Assessment/Plan:     History of total knee arthroplasty, left  Osteoarthritis of both knees, unspecified osteoarthritis type  -Leave surgical bandage in place until follow-up with Ortho.  -Continue Tylenol and oxycodone for pain.  -Postoperative hemoglobin 9.6.  -Follow up with Ortho in 1 week.   -tubi  on in am/off at HS.   -Encourage elevation.     Hypomagnesemia  -magnesium 400 mg twice daily.  -Follow-up magnesium 2.0.    Type 2 diabetes mellitus with diabetic neuropathy, without long-term current use of insulin (H)  -Metformin 500 mg 2 times daily.  -Blood sugar checks twice daily.  -Consistent carb diet.    Collagenous colitis  -Chronic loose stools.  -Bentyl 20 mg 3 times daily as needed.    Neuropathy  -Gabapentin 600 mg twice  daily.    Okay to discharge home with current meds and treatments.  Home PT, OT, home health aide and RN for medication management.  Okay to send with #15 tabs of oxycodone.  Follow-up with Ortho outpatient.  Follow-up with primary care provider in 1 to 2 weeks.    Active Ambulatory Problems     Diagnosis Date Noted     Weakness 07/22/2023     Collagenous colitis 11/18/2022     Hyperlipidemia 11/04/2006     Hyperparathyroidism (H) 02/12/2020     Hypertension 11/23/2004     Morbid obesity (H) 10/03/2012     Type 2 diabetes mellitus with diabetic neuropathy, without long-term current use of insulin (H) 02/12/2020     Allergic rhinitis 11/23/2004     Carpal tunnel syndrome 09/16/2004     Vitamin D deficiency 05/02/2011     Sciatica 11/23/2004     Restless legs syndrome 06/12/2008     Prediabetes 07/25/2023     Neuropathy 07/25/2023     Iron deficiency 06/12/2008     Gout 11/23/2004     Edema 11/23/2004     Coronary atherosclerosis 03/30/2005     Resolved Ambulatory Problems     Diagnosis Date Noted     No Resolved Ambulatory Problems     No Additional Past Medical History     Allergies   Allergen Reactions     Naproxen Nausea     Penicillins Hives     Triamterene Rash       All Meds and Allergies reviewed in the record at the facility and is the most up-to-date.    Current Outpatient Medications   Medication Sig     acetaminophen (TYLENOL) 325 MG tablet Take 650 mg by mouth 3 times daily as needed for mild pain     alendronate (FOSAMAX) 70 MG tablet Take 70 mg by mouth every 7 days     allopurinol (ZYLOPRIM) 300 MG tablet Take 300 mg by mouth daily     atorvastatin (LIPITOR) 40 MG tablet Take 40 mg by mouth daily     dicyclomine (BENTYL) 20 MG tablet Take 20 mg by mouth 3 times daily as needed     furosemide (LASIX) 40 MG tablet Take 40 mg by mouth daily     gabapentin (NEURONTIN) 300 MG capsule Take 600 mg by mouth 2 times daily     lisinopril (ZESTRIL) 10 MG tablet Take 10 mg by mouth daily     metFORMIN (GLUCOPHAGE)  500 MG tablet Take 500 mg by mouth 2 times daily (with meals)     omega-3 acid ethyl esters (LOVAZA) 1 g capsule Take 1 g by mouth daily     oxyCODONE (ROXICODONE) 5 MG tablet Take 1 tablet (5 mg) by mouth every 4 hours as needed for severe pain     potassium chloride ER (KLOR-CON M) 10 MEQ CR tablet Take 10 mEq by mouth 2 times daily     primidone (MYSOLINE) 50 MG tablet Take 100 mg by mouth 3 times daily     tiZANidine (ZANAFLEX) 4 MG tablet Take 4 mg by mouth 2 times daily     No current facility-administered medications for this visit.       REVIEW OF SYSTEMS:   10 point review of systems reviewed and pertinent positives in the HPI.     PHYSICAL EXAMINATION:  Physical Exam     Vital signs: /79   Pulse 93   Temp 97.4  F (36.3  C)   Resp 16   Ht 1.524 m (5')   Wt 85.5 kg (188 lb 9.6 oz)   SpO2 93%   BMI 36.83 kg/m    General: Awake, Alert, oriented x3, sitting up in bedside chair, conversant  HEENT:Pink conjunctiva, moist oral mucosa  NECK: Supple  BACK: No kyphosis of the thoracic spine  ABDOMEN: Soft, obese, with positive bowel sounds  EXTREMITIES: Moves both upper and lower extremities limited to the left lower extremity, 2+ pedal edema, large extremities, no calf tenderness.  Positive dorsi and plantarflexion.  SKIN: Warm and dry, surgical bandage in place to left knee.  NEUROLOGIC: Intact, pulses palpable  PSYCHIATRIC: Flat affect.  Cognitive impairment noted.      Labs:  All labs reviewed in the nursing home record and [facility]   @  Lab Results   Component Value Date    WBC 8.5 07/22/2023     Lab Results   Component Value Date    RBC 3.88 07/22/2023     Lab Results   Component Value Date    HGB 9.6 07/22/2023     Lab Results   Component Value Date    HCT 31.7 07/22/2023     Lab Results   Component Value Date    MCV 82 07/22/2023     Lab Results   Component Value Date    MCH 24.7 07/22/2023     Lab Results   Component Value Date    MCHC 30.3 07/22/2023     Lab Results   Component Value Date    RDW  17.5 07/22/2023     Lab Results   Component Value Date     07/25/2023        @Last Comprehensive Metabolic Panel:  Sodium   Date Value Ref Range Status   07/22/2023 141 136 - 145 mmol/L Final     Potassium   Date Value Ref Range Status   07/22/2023 3.8 3.4 - 5.3 mmol/L Final     Chloride   Date Value Ref Range Status   07/22/2023 108 (H) 98 - 107 mmol/L Final     Carbon Dioxide (CO2)   Date Value Ref Range Status   07/22/2023 23 22 - 29 mmol/L Final     Anion Gap   Date Value Ref Range Status   07/22/2023 10 7 - 15 mmol/L Final     GLUCOSE BY METER POCT   Date Value Ref Range Status   07/25/2023 101 (H) 70 - 99 mg/dL Final     Urea Nitrogen   Date Value Ref Range Status   07/22/2023 11.5 8.0 - 23.0 mg/dL Final     Creatinine   Date Value Ref Range Status   07/22/2023 0.62 0.51 - 0.95 mg/dL Final     GFR Estimate   Date Value Ref Range Status   07/22/2023 >90 >60 mL/min/1.73m2 Final     Calcium   Date Value Ref Range Status   07/22/2023 10.8 (H) 8.8 - 10.2 mg/dL Final     DISCHARGE PLAN/FACE TO FACE:  I certify that this patient is under my care and that I, or a nurse practitioner or physician's assistant working with me, had a face-to-face encounter that meets the physician face-to-face encounter requirements with this patient.       I certify that, based on my findings, the following services are medically necessary home health services.    My clinical findings support the need for the above skilled services.    This patient is homebound because: Recent left TKA due to osteoarthritis.    The patient is, or has been, under my care and I have initiated the establishment of the plan of care. This patient will be followed by a physician who will periodically review the plan of care.    35 minutes spent of which time was spent reviewing discharge medications, home care services and follow-ups as well as collaborating with nursing staff and social work.      This note has been dictated using voice recognition  software. Any grammatical or context distortions are unintentional and inherent to the software    Electronically signed by: Aleshia Odom CNP       Sincerely,        Aleshia Odom, NP

## 2023-08-08 NOTE — PROGRESS NOTES
[facility]    Code Status:  FULL CODE   Visit Type:   Chief Complaint   Patient presents with    TCU Discharge     Facility:  [facility] ([facility]) [[id number]]         HPI: Lorie Garrido is a 77 year old female who I am seeing today for discharge from the TCU.  Patient recently hospitalized on 7/22/2023 secondary to DJD of the knees status post left TKA.  Patient presented with general weakness with failure to thrive at home.  Generalized weakness multifactorial secondary to chronic knee pain with DJD, poor appetite and oral intake.  Patient with history of TKA of the left.  Postoperative pain control oxycodone.  Hypomagnesia replaced.  Diabetes mellitus type 2 on metformin.  Hypertension satisfactory.  IBS with chronic diarrhea.  Chronic leg edema on Lasix.  Peripheral neuropathy on gabapentin.    Transitional Care Course: Today patient sitting up in wheelchair. Patient status post left total knee arthroplasty.  Pain control with Tylenol codeine.  Patient continues with surgical bandage in place. 2-3+ edema. Large extremities. Pt continues on lasix. No SOB or CP.      Assessment/Plan:     History of total knee arthroplasty, left  Osteoarthritis of both knees, unspecified osteoarthritis type  -Leave surgical bandage in place until follow-up with Ortho.  -Continue Tylenol and oxycodone for pain.  -Postoperative hemoglobin 9.6.  -Follow up with Ortho in 1 week.   -tubi  on in am/off at HS.   -Encourage elevation.     Hypomagnesemia  -magnesium 400 mg twice daily.  -Follow-up magnesium 2.0.    Type 2 diabetes mellitus with diabetic neuropathy, without long-term current use of insulin (H)  -Metformin 500 mg 2 times daily.  -Blood sugar checks twice daily.  -Consistent carb diet.    Collagenous colitis  -Chronic loose stools.  -Bentyl 20 mg 3 times daily as needed.    Neuropathy  -Gabapentin 600 mg twice daily.    Okay to discharge home with current meds and treatments.  Home PT, OT, home  health aide and RN for medication management.  Okay to send with #15 tabs of oxycodone.  Follow-up with Ortho outpatient.  Follow-up with primary care provider in 1 to 2 weeks.    Active Ambulatory Problems     Diagnosis Date Noted    Weakness 07/22/2023    Collagenous colitis 11/18/2022    Hyperlipidemia 11/04/2006    Hyperparathyroidism (H) 02/12/2020    Hypertension 11/23/2004    Morbid obesity (H) 10/03/2012    Type 2 diabetes mellitus with diabetic neuropathy, without long-term current use of insulin (H) 02/12/2020    Allergic rhinitis 11/23/2004    Carpal tunnel syndrome 09/16/2004    Vitamin D deficiency 05/02/2011    Sciatica 11/23/2004    Restless legs syndrome 06/12/2008    Prediabetes 07/25/2023    Neuropathy 07/25/2023    Iron deficiency 06/12/2008    Gout 11/23/2004    Edema 11/23/2004    Coronary atherosclerosis 03/30/2005     Resolved Ambulatory Problems     Diagnosis Date Noted    No Resolved Ambulatory Problems     No Additional Past Medical History     Allergies   Allergen Reactions    Naproxen Nausea    Penicillins Hives    Triamterene Rash       All Meds and Allergies reviewed in the record at the facility and is the most up-to-date.    Current Outpatient Medications   Medication Sig    acetaminophen (TYLENOL) 325 MG tablet Take 650 mg by mouth 3 times daily as needed for mild pain    alendronate (FOSAMAX) 70 MG tablet Take 70 mg by mouth every 7 days    allopurinol (ZYLOPRIM) 300 MG tablet Take 300 mg by mouth daily    atorvastatin (LIPITOR) 40 MG tablet Take 40 mg by mouth daily    dicyclomine (BENTYL) 20 MG tablet Take 20 mg by mouth 3 times daily as needed    furosemide (LASIX) 40 MG tablet Take 40 mg by mouth daily    gabapentin (NEURONTIN) 300 MG capsule Take 600 mg by mouth 2 times daily    lisinopril (ZESTRIL) 10 MG tablet Take 10 mg by mouth daily    metFORMIN (GLUCOPHAGE) 500 MG tablet Take 500 mg by mouth 2 times daily (with meals)    omega-3 acid ethyl esters (LOVAZA) 1 g capsule Take  1 g by mouth daily    oxyCODONE (ROXICODONE) 5 MG tablet Take 1 tablet (5 mg) by mouth every 4 hours as needed for severe pain    potassium chloride ER (KLOR-CON M) 10 MEQ CR tablet Take 10 mEq by mouth 2 times daily    primidone (MYSOLINE) 50 MG tablet Take 100 mg by mouth 3 times daily    tiZANidine (ZANAFLEX) 4 MG tablet Take 4 mg by mouth 2 times daily     No current facility-administered medications for this visit.       REVIEW OF SYSTEMS:   10 point review of systems reviewed and pertinent positives in the HPI.     PHYSICAL EXAMINATION:  Physical Exam     Vital signs: /79   Pulse 93   Temp 97.4  F (36.3  C)   Resp 16   Ht 1.524 m (5')   Wt 85.5 kg (188 lb 9.6 oz)   SpO2 93%   BMI 36.83 kg/m    General: Awake, Alert, oriented x3, sitting up in bedside chair, conversant  HEENT:Pink conjunctiva, moist oral mucosa  NECK: Supple  BACK: No kyphosis of the thoracic spine  ABDOMEN: Soft, obese, with positive bowel sounds  EXTREMITIES: Moves both upper and lower extremities limited to the left lower extremity, 2+ pedal edema, large extremities, no calf tenderness.  Positive dorsi and plantarflexion.  SKIN: Warm and dry, surgical bandage in place to left knee.  NEUROLOGIC: Intact, pulses palpable  PSYCHIATRIC: Flat affect.  Cognitive impairment noted.      Labs:  All labs reviewed in the nursing home record and [facility]   @  Lab Results   Component Value Date    WBC 8.5 07/22/2023     Lab Results   Component Value Date    RBC 3.88 07/22/2023     Lab Results   Component Value Date    HGB 9.6 07/22/2023     Lab Results   Component Value Date    HCT 31.7 07/22/2023     Lab Results   Component Value Date    MCV 82 07/22/2023     Lab Results   Component Value Date    MCH 24.7 07/22/2023     Lab Results   Component Value Date    MCHC 30.3 07/22/2023     Lab Results   Component Value Date    RDW 17.5 07/22/2023     Lab Results   Component Value Date     07/25/2023        @Last Comprehensive Metabolic  Panel:  Sodium   Date Value Ref Range Status   07/22/2023 141 136 - 145 mmol/L Final     Potassium   Date Value Ref Range Status   07/22/2023 3.8 3.4 - 5.3 mmol/L Final     Chloride   Date Value Ref Range Status   07/22/2023 108 (H) 98 - 107 mmol/L Final     Carbon Dioxide (CO2)   Date Value Ref Range Status   07/22/2023 23 22 - 29 mmol/L Final     Anion Gap   Date Value Ref Range Status   07/22/2023 10 7 - 15 mmol/L Final     GLUCOSE BY METER POCT   Date Value Ref Range Status   07/25/2023 101 (H) 70 - 99 mg/dL Final     Urea Nitrogen   Date Value Ref Range Status   07/22/2023 11.5 8.0 - 23.0 mg/dL Final     Creatinine   Date Value Ref Range Status   07/22/2023 0.62 0.51 - 0.95 mg/dL Final     GFR Estimate   Date Value Ref Range Status   07/22/2023 >90 >60 mL/min/1.73m2 Final     Calcium   Date Value Ref Range Status   07/22/2023 10.8 (H) 8.8 - 10.2 mg/dL Final     DISCHARGE PLAN/FACE TO FACE:  I certify that this patient is under my care and that I, or a nurse practitioner or physician's assistant working with me, had a face-to-face encounter that meets the physician face-to-face encounter requirements with this patient.       I certify that, based on my findings, the following services are medically necessary home health services.    My clinical findings support the need for the above skilled services.    This patient is homebound because: Recent left TKA due to osteoarthritis.    The patient is, or has been, under my care and I have initiated the establishment of the plan of care. This patient will be followed by a physician who will periodically review the plan of care.    35 minutes spent of which time was spent reviewing discharge medications, home care services and follow-ups as well as collaborating with nursing staff and social work.      This note has been dictated using voice recognition software. Any grammatical or context distortions are unintentional and inherent to the software    Electronically  signed by: Aleshia Odom, CNP

## 2023-09-11 RX ORDER — POTASSIUM CHLORIDE 750 MG/1
10 TABLET, EXTENDED RELEASE ORAL 2 TIMES DAILY
Qty: 180 TABLET | OUTPATIENT
Start: 2023-09-11